# Patient Record
Sex: FEMALE | Race: WHITE | NOT HISPANIC OR LATINO | Employment: OTHER | URBAN - METROPOLITAN AREA
[De-identification: names, ages, dates, MRNs, and addresses within clinical notes are randomized per-mention and may not be internally consistent; named-entity substitution may affect disease eponyms.]

---

## 2017-09-11 ENCOUNTER — TRANSCRIBE ORDERS (OUTPATIENT)
Dept: ADMINISTRATIVE | Facility: HOSPITAL | Age: 82
End: 2017-09-11

## 2017-09-11 DIAGNOSIS — Z12.31 VISIT FOR SCREENING MAMMOGRAM: Primary | ICD-10-CM

## 2017-09-27 ENCOUNTER — HOSPITAL ENCOUNTER (OUTPATIENT)
Dept: RADIOLOGY | Facility: MEDICAL CENTER | Age: 82
Discharge: HOME | End: 2017-09-27
Payer: MEDICARE

## 2017-09-27 DIAGNOSIS — Z12.31 VISIT FOR SCREENING MAMMOGRAM: ICD-10-CM

## 2017-09-27 PROCEDURE — G0202 SCR MAMMO BI INCL CAD: HCPCS

## 2017-09-29 ENCOUNTER — TRANSCRIBE ORDERS (OUTPATIENT)
Dept: ADMINISTRATIVE | Facility: HOSPITAL | Age: 82
End: 2017-09-29

## 2017-09-29 DIAGNOSIS — R68.81 EARLY SATIETY: Primary | ICD-10-CM

## 2017-10-05 ENCOUNTER — HOSPITAL ENCOUNTER (OUTPATIENT)
Dept: RADIOLOGY | Facility: HOSPITAL | Age: 82
Discharge: HOME | End: 2017-10-05
Attending: INTERNAL MEDICINE
Payer: MEDICARE

## 2017-10-05 DIAGNOSIS — R68.81 EARLY SATIETY: ICD-10-CM

## 2017-10-05 PROCEDURE — 74240 X-RAY XM UPR GI TRC 1CNTRST: CPT

## 2018-02-27 ENCOUNTER — TRANSCRIBE ORDERS (OUTPATIENT)
Dept: ADMINISTRATIVE | Facility: HOSPITAL | Age: 83
End: 2018-02-27

## 2018-02-27 ENCOUNTER — LAB REQUISITION (OUTPATIENT)
Dept: LAB | Facility: HOSPITAL | Age: 83
End: 2018-02-27
Payer: MEDICARE

## 2018-02-27 DIAGNOSIS — N39.0 URINARY TRACT INFECTION WITHOUT HEMATURIA, SITE UNSPECIFIED: Primary | ICD-10-CM

## 2018-02-27 DIAGNOSIS — N39.0 URINARY TRACT INFECTION: ICD-10-CM

## 2018-02-27 PROCEDURE — 87086 URINE CULTURE/COLONY COUNT: CPT | Performed by: UROLOGY

## 2018-02-28 LAB — BACTERIA UR CULT: NORMAL

## 2018-03-02 ENCOUNTER — APPOINTMENT (OUTPATIENT)
Dept: LAB | Facility: MEDICAL CENTER | Age: 83
End: 2018-03-02
Payer: MEDICARE

## 2018-03-02 ENCOUNTER — HOSPITAL ENCOUNTER (OUTPATIENT)
Dept: RADIOLOGY | Facility: MEDICAL CENTER | Age: 83
Discharge: HOME | End: 2018-03-02
Payer: MEDICARE

## 2018-03-02 DIAGNOSIS — N39.0 URINARY TRACT INFECTION WITHOUT HEMATURIA, SITE UNSPECIFIED: ICD-10-CM

## 2018-03-02 LAB
BUN SERPL-MCNC: 14 MG/DL (ref 5–25)
CREAT SERPL-MCNC: 0.8 MG/DL (ref 0.6–1.3)
ERYTHROCYTE [DISTWIDTH] IN BLOOD BY AUTOMATED COUNT: 13.1 % (ref 11.6–15.1)
GFR SERPL CREATININE-BSD FRML MDRD: 68 ML/MIN/1.73SQ M
HCT VFR BLD AUTO: 42.8 % (ref 34.8–46.1)
HGB BLD-MCNC: 13.6 G/DL (ref 11.5–15.4)
MCH RBC QN AUTO: 30.6 PG (ref 26.8–34.3)
MCHC RBC AUTO-ENTMCNC: 31.8 G/DL (ref 31.4–37.4)
MCV RBC AUTO: 96 FL (ref 82–98)
PLATELET # BLD AUTO: 177 THOUSANDS/UL (ref 149–390)
PMV BLD AUTO: 11 FL (ref 8.9–12.7)
RBC # BLD AUTO: 4.45 MILLION/UL (ref 3.81–5.12)
WBC # BLD AUTO: 4.76 THOUSAND/UL (ref 4.31–10.16)

## 2018-03-02 PROCEDURE — 51798 US URINE CAPACITY MEASURE: CPT

## 2018-03-02 PROCEDURE — 84520 ASSAY OF UREA NITROGEN: CPT

## 2018-03-02 PROCEDURE — 82565 ASSAY OF CREATININE: CPT

## 2018-03-02 PROCEDURE — 36415 COLL VENOUS BLD VENIPUNCTURE: CPT

## 2018-03-02 PROCEDURE — 85027 COMPLETE CBC AUTOMATED: CPT

## 2018-10-23 ENCOUNTER — TRANSCRIBE ORDERS (OUTPATIENT)
Dept: ADMINISTRATIVE | Facility: HOSPITAL | Age: 83
End: 2018-10-23

## 2018-10-23 DIAGNOSIS — Z12.39 SCREENING BREAST EXAMINATION: Primary | ICD-10-CM

## 2019-01-17 ENCOUNTER — APPOINTMENT (OUTPATIENT)
Dept: RADIOLOGY | Facility: MEDICAL CENTER | Age: 84
End: 2019-01-17
Payer: MEDICARE

## 2019-01-17 ENCOUNTER — TRANSCRIBE ORDERS (OUTPATIENT)
Dept: ADMINISTRATIVE | Facility: HOSPITAL | Age: 84
End: 2019-01-17

## 2019-01-17 DIAGNOSIS — M05.9 RHEUMATOID ARTHRITIS WITH POSITIVE RHEUMATOID FACTOR, INVOLVING UNSPECIFIED SITE (HCC): Primary | ICD-10-CM

## 2019-01-17 DIAGNOSIS — M05.9 RHEUMATOID ARTHRITIS WITH POSITIVE RHEUMATOID FACTOR, INVOLVING UNSPECIFIED SITE (HCC): ICD-10-CM

## 2019-01-17 PROCEDURE — 73030 X-RAY EXAM OF SHOULDER: CPT

## 2019-11-19 ENCOUNTER — HOSPITAL ENCOUNTER (OUTPATIENT)
Dept: RADIOLOGY | Facility: MEDICAL CENTER | Age: 84
Discharge: HOME | End: 2019-11-19
Payer: MEDICARE

## 2019-11-19 VITALS — HEIGHT: 59 IN | WEIGHT: 122 LBS | BODY MASS INDEX: 24.6 KG/M2

## 2019-11-19 DIAGNOSIS — Z12.39 SCREENING BREAST EXAMINATION: ICD-10-CM

## 2019-11-19 PROCEDURE — 77067 SCR MAMMO BI INCL CAD: CPT

## 2019-12-03 ENCOUNTER — TRANSCRIBE ORDERS (OUTPATIENT)
Dept: ADMINISTRATIVE | Facility: HOSPITAL | Age: 84
End: 2019-12-03

## 2019-12-03 DIAGNOSIS — J98.4 SCARRING OF LUNG: Primary | ICD-10-CM

## 2019-12-11 ENCOUNTER — HOSPITAL ENCOUNTER (OUTPATIENT)
Dept: RADIOLOGY | Facility: MEDICAL CENTER | Age: 84
Discharge: HOME | End: 2019-12-11
Payer: MEDICARE

## 2019-12-11 DIAGNOSIS — J98.4 SCARRING OF LUNG: ICD-10-CM

## 2019-12-11 PROCEDURE — 71250 CT THORAX DX C-: CPT

## 2020-01-22 ENCOUNTER — OFFICE VISIT (OUTPATIENT)
Dept: OBGYN CLINIC | Facility: OTHER | Age: 85
End: 2020-01-22
Payer: MEDICARE

## 2020-01-22 ENCOUNTER — APPOINTMENT (OUTPATIENT)
Dept: RADIOLOGY | Facility: OTHER | Age: 85
End: 2020-01-22
Payer: MEDICARE

## 2020-01-22 VITALS
SYSTOLIC BLOOD PRESSURE: 192 MMHG | WEIGHT: 127 LBS | BODY MASS INDEX: 24.94 KG/M2 | HEART RATE: 78 BPM | DIASTOLIC BLOOD PRESSURE: 72 MMHG | HEIGHT: 60 IN

## 2020-01-22 DIAGNOSIS — M19.011 PRIMARY OSTEOARTHRITIS OF RIGHT SHOULDER: ICD-10-CM

## 2020-01-22 DIAGNOSIS — M25.511 ACUTE PAIN OF RIGHT SHOULDER: ICD-10-CM

## 2020-01-22 DIAGNOSIS — M54.12 RIGHT CERVICAL RADICULOPATHY: ICD-10-CM

## 2020-01-22 DIAGNOSIS — M25.511 RIGHT SHOULDER PAIN, UNSPECIFIED CHRONICITY: ICD-10-CM

## 2020-01-22 DIAGNOSIS — M75.81 RIGHT ROTATOR CUFF TENDONITIS: Primary | ICD-10-CM

## 2020-01-22 PROCEDURE — 73030 X-RAY EXAM OF SHOULDER: CPT

## 2020-01-22 PROCEDURE — 99203 OFFICE O/P NEW LOW 30 MIN: CPT | Performed by: ORTHOPAEDIC SURGERY

## 2020-01-22 RX ORDER — HYDROCHLOROTHIAZIDE 12.5 MG/1
TABLET ORAL
COMMUNITY
Start: 2020-01-21 | End: 2021-04-06 | Stop reason: HOSPADM

## 2020-01-22 RX ORDER — LOSARTAN POTASSIUM 50 MG/1
TABLET ORAL
COMMUNITY
Start: 2016-11-16 | End: 2021-04-06 | Stop reason: HOSPADM

## 2020-01-22 RX ORDER — ATORVASTATIN CALCIUM 20 MG/1
TABLET, FILM COATED ORAL
COMMUNITY

## 2020-01-22 NOTE — PROGRESS NOTES
Orthopaedic Surgery - Office Note  Ozzy Felix (80 y o  female)   : 1933   MRN: [id number]  Encounter Date: 2020    Chief Complaint   Patient presents with    Right Shoulder - Pain       Assessment / Plan   Diagnosis ICD-10-CM Associated Orders   1  Right rotator cuff tendonitis M75 81 Ambulatory referral to Physical Therapy   2  Acute pain of right shoulder M25 511 XR shoulder 2+ vw right   3  Right cervical radiculopathy M54 12 Ambulatory referral to Physical Therapy   4  Primary osteoarthritis of right shoulder M19 011        · Diagnostics reviewed and physical exam performed  Diagnosis, treatment options and associated risks were discussed with the patient including no treatment, nonsurgical treatment and potential for surgical intervention  The patient was given the opportunity to ask questions regarding each  · Appears that she has rotator cuff inflammation with underlying cervical radiculopathy  · Activity as tolerated  · Begin outpatient PT for rotator cuff strengthening and restoring shoulder motion     Return in about 6 weeks (around 3/4/2020) for re-check  History of Present Illness  Ozzy Felix is a 80 y o  LHD female who presents surgical consultation regarding pain at her right shoulder  Patient states that she fell In November aggravating her right shoulder symptoms however was doing well until about 2 weeks ago, when her pain returned  She went to a local urgent care facilities where x-rays were taken and followed her primary care physician  At her PCPs office a right shoulder cortisone injection was administered without any improvement of her right shoulder symptoms  She does find that her right upper extremity is weaker than previously  She has pain laterally at her right upper extremity which radiates just proximally to her elbow, occasionally to her fingers with numbness or tingling  She denies any significant neck pain    Patient has a history of RA and takes prednisone  Review of Systems  Pertinent items are noted in HPI  All other systems were reviewed and are negative  Physical Exam  BP (!) 192/72   Pulse 78   Ht 5' (1 524 m)   Wt 57 6 kg (127 lb)   BMI 24 80 kg/m²   Cons: Appears well  No apparent distress  Psych: Alert  Oriented x3  Mood and affect normal   Eyes: PERRLA, EOMI  Resp: Normal effort  No audible wheezing or stridor  CV: Palpable pulse  No discernable arrhythmia  No LE edema  Lymph:  No palpable cervical, axillary, or inguinal lymphadenopathy  Skin: Warm  No palpable masses  No visible lesions  Neuro: Normal muscle tone  Normal and symmetric DTR's  Right Shoulder Exam  Alignment / Posture:  Normal shoulder posture  Inspection:  No swelling  No edema  No erythema  No ecchymosis  No muscle atrophy  No deformity  Palpation:  Mild tenderness at subacromial space  No effusion  No warmth  No clicking, catching, or snapping  ROM:  Shoulder  vs L = 160  Shoulder ER 60 vs L = 60  Shoulder IR T10  Strength:  Rotator cuff 4+/5, mild discomfort but no giving way with resistance  5/5 biceps and triceps  Stability:  No objective shoulder instability  Tests: (+) Goodwin  (-) Neer  mildly + spurlings to the right  Neurovascular:  Sensation intact C5-T1 BUE  Studies Reviewed  The attending physician has personally reviewed the pertinent films in PACS and interpretation is as follows:  Right shoulder x-rays taken and reviewed in the office today show:  No acute fracture dislocation, moderate AC joint degeneration with mild glenohumeral joint degenerative changes, cystic changes greater tuberosity, type 2/3 acromion    Procedures  No procedures today  Medical, Surgical, Family, and Social History  The patient's medical history, family history, and social history, were reviewed and updated as appropriate  History reviewed  No pertinent past medical history      Past Surgical History:   Procedure Laterality Date    BREAST BIOPSY Left 02/18/2010    calcs    HYSTERECTOMY      age 40    OOPHORECTOMY Right     age 40       Family History   Problem Relation Age of Onset    No Known Problems Mother     No Known Problems Father     No Known Problems Sister     No Known Problems Daughter     No Known Problems Sister     No Known Problems Son     No Known Problems Son     No Known Problems Son        Social History     Occupational History    Not on file   Tobacco Use    Smoking status: Never Smoker    Smokeless tobacco: Never Used   Substance and Sexual Activity    Alcohol use: Not on file    Drug use: Not on file    Sexual activity: Not on file       Allergies   Allergen Reactions    Abatacept     Acyclovir GI Intolerance    Celecoxib     Denosumab Other (See Comments)     Severe jaw pain    Hydroxychloroquine Hives    Nabumetone Hives    Pantoprazole Hives    Sunscreens          Current Outpatient Medications:     atorvastatin (LIPITOR) 20 mg tablet, , Disp: , Rfl:     Calcium-Vitamin D-Vitamin K 500-1000-40 MG-UNT-MCG CHEW, , Disp: , Rfl:     hydrochlorothiazide (HYDRODIURIL) 12 5 mg tablet, , Disp: , Rfl:     losartan (COZAAR) 50 mg tablet, , Disp: , Rfl:     predniSONE 1 mg tablet, Take 2 5 mg by mouth daily, Disp: , Rfl:       Bessie Barrera    Scribe Attestation    I,:   Bessie Barrera am acting as a scribe while in the presence of the attending physician :        I,:   Ela Pham MD personally performed the services described in this documentation    as scribed in my presence :

## 2020-01-23 ENCOUNTER — EVALUATION (OUTPATIENT)
Dept: PHYSICAL THERAPY | Facility: MEDICAL CENTER | Age: 85
End: 2020-01-23
Payer: MEDICARE

## 2020-01-23 DIAGNOSIS — M54.12 RIGHT CERVICAL RADICULOPATHY: ICD-10-CM

## 2020-01-23 DIAGNOSIS — M75.81 RIGHT ROTATOR CUFF TENDONITIS: ICD-10-CM

## 2020-01-23 PROCEDURE — 97161 PT EVAL LOW COMPLEX 20 MIN: CPT | Performed by: PHYSICAL THERAPIST

## 2020-01-23 PROCEDURE — 97110 THERAPEUTIC EXERCISES: CPT | Performed by: PHYSICAL THERAPIST

## 2020-01-23 NOTE — PROGRESS NOTES
PT Evaluation     Today's date: 2020  Patient name: Ryan Saucedo  : 1933  MRN: [id number]  Referring provider: Bib Saavedra MD  Dx:   Encounter Diagnosis     ICD-10-CM    1  Right rotator cuff tendonitis M75 81 Ambulatory referral to Physical Therapy   2  Right cervical radiculopathy M54 12 Ambulatory referral to Physical Therapy       Start Time: 1405  Stop Time: 1440  Total time in clinic (min): 35 minutes    Assessment  Assessment details: Pt is a 80 y o female who presents with increased R shoulder pain, decreased R shoulder ROM, and decreased activity tolerance  These impairments limit the patient from participating in ADLs at [facility], increased difficulty performing house hold tasks, and decreased ability to participate in the community  Pt was educated on the RTC, its role in shoulder mechanics and the pathophysiology of her condition  I believe this patient is a good candidate for and will benefit from skilled physical therapy for UE ROM exercises, UE strengthening exercises, manual therapy to reduce symptoms and mechanics training to improve UE functional ability and assist the patient to return to PLOF      Positive Prognostic Indicators: good attitude towards PT    Negative Prognostic Indicators: chronic pathology  Impairments: abnormal or restricted ROM, activity intolerance, impaired physical strength, lacks appropriate home exercise program, pain with function and poor posture     Symptom irritability: lowUnderstanding of Dx/Px/POC: good   Prognosis: good    Goals  STGs: 4 weeks  1) pt will have a SPR decrease of 2 units at rest  2) pt will have improved R shoulder flexion AROM by 10*  3) pt will have improved R shoulder flexion strength by 1/2 muscle grade    LTGs: 8 weeks  1) pt will be independent with HEP by D/C  2) pt will be independent with symptom management by D/C  3) pt will have no more than 1/10 pain with above head reaching in order to return to cleaning house at PLOF    Plan  Patient would benefit from: skilled physical therapy  Planned modality interventions: cryotherapy and thermotherapy: hydrocollator packs  Planned therapy interventions: joint mobilization, manual therapy, neuromuscular re-education, patient education, postural training, strengthening, stretching, therapeutic activities, therapeutic exercise, home exercise program and functional ROM exercises  Frequency: 2x week  Duration in visits: 12  Duration in weeks: 6  Plan of Care beginning date: 1/23/2020  Plan of Care expiration date: 3/5/2020  Treatment plan discussed with: patient        Subjective Evaluation    History of Present Illness  Mechanism of injury:   Subjective Comments: fell in beginning of November  Fell on R UE  Went to ER  Had Xray  Was on prednisone  Now she was taking double  It was helping with pain but only making it manageable  Now pain has increased again  Pt had cortisone shot which did not help  Then she went to ortho MD   Pt originates in the shoulder and goes down the arm  Denies neck pain and hitting head during fall  Denies N&T in arm now  She had N&T when she first fell, but this has went away  Reaching up high and behind back are the most painful  Pt has increased difficulty picking up heavy objects  Pt is L handed  Pain   Rest: 4/10   Best: 2/10   Worst: 10/10    Relieving Factors: medications    Exacerbating Factors: bad weather makes the pain worse, lifitng objects    Sleeping: sleeping ok but no problems because of shoulder    ADLs: cleaning pushing vacuum, reaching too high to dust    Work/Hobbies: coshering     Previous Treatment: cortisone injection, prednisone,     Goals: wants to move arm better, decrease pain, get it close to prior to falling  Objective     Static Posture     Head  Forward  Shoulders  Rounded  Scapulae  Left protracted and right protracted      Palpation     Right   Tenderness of the anterior deltoid, infraspinatus, middle deltoid, posterior deltoid, supraspinatus and upper trapezius  Active Range of Motion   Left Shoulder   Flexion: 131 degrees   Abduction: 168 degrees   External rotation BTH: T5   Internal rotation BTB: T6     Right Shoulder   Flexion: 113 degrees   Abduction: 115 degrees   External rotation BTH: T2   Internal rotation BTB: L3     Passive Range of Motion   Left Shoulder   Normal passive range of motion    Right Shoulder   Flexion: 150 degrees with pain  Abduction: 170 degrees with pain  External rotation 90°: 90 degrees with pain  Internal rotation 90°: 50 degrees with pain    Strength/Myotome Testing     Left Shoulder     Planes of Motion   Flexion: 4+   Abduction: 4+   External rotation at 0°: 4   Internal rotation at 0°: 4+     Right Shoulder     Planes of Motion   Flexion: 4   Abduction: 3+   External rotation at 0°: 3+   Internal rotation at 0°: 4     Tests     Right Shoulder   Positive Christopher/Leonard and Neer's  Negative drop arm         Flowsheet Rows      Most Recent Value   PT/OT G-Codes   Current Score  55   Projected Score  66   Assessment Type  Evaluation   G code set  Other PT/OT Primary   Other PT Primary Current Status ()  CK   Other PT Primary Goal Status ()  CJ             Precautions: arrhythmias, HTN      Manual              R shoulder PROM                                                                     Exercise Diary  1/23            Supine cane flexion x10            Isometrics (flex/abd/IR/ER) 5"x10 ea             pullys             Seated scap retract             pendulums                                                                                                                                                                                                                    Modalities

## 2020-01-29 ENCOUNTER — OFFICE VISIT (OUTPATIENT)
Dept: PHYSICAL THERAPY | Facility: MEDICAL CENTER | Age: 85
End: 2020-01-29
Payer: MEDICARE

## 2020-01-29 DIAGNOSIS — M54.12 RIGHT CERVICAL RADICULOPATHY: ICD-10-CM

## 2020-01-29 DIAGNOSIS — M75.81 RIGHT ROTATOR CUFF TENDONITIS: Primary | ICD-10-CM

## 2020-01-29 PROCEDURE — 97110 THERAPEUTIC EXERCISES: CPT | Performed by: PHYSICAL THERAPIST

## 2020-01-29 PROCEDURE — 97112 NEUROMUSCULAR REEDUCATION: CPT | Performed by: PHYSICAL THERAPIST

## 2020-01-29 NOTE — PROGRESS NOTES
Daily Note     Today's date: 2020  Patient name: Dayanna Lanier  : 1933  MRN: [id number]  Referring provider: Arianna Alvarez MD  Dx:   Encounter Diagnosis     ICD-10-CM    1  Right rotator cuff tendonitis M75 81    2  Right cervical radiculopathy M54 12        Start Time: 930  Stop Time: 1005  Total time in clinic (min): 35 minutes    Subjective: pt states that her shoulder is feeling a little better  Objective: See treatment diary below      Assessment: Tolerated treatment well  Pt had improved tolerance to exercises this session  Additional exercises caused mild increase of symptoms that the patient stated were tolerable  HEP progressed  Continue to monitor and progress as tolerated  Patient would benefit from continued PT      Plan: Continue per plan of care  Precautions: arrhythmias, HTN      Manual              R shoulder PROM 8'                                                                    Exercise Diary             Supine cane flexion x10 x20           Isometrics (flex/abd/IR/ER) 5"x10 ea   5" x10 ea            pullys  5'           Seated scap retract  5" x10           pendulums  nv           TB ER/IR  nv           TB rows/ext  rtb 2x10                                                                                                                                                                                        Modalities

## 2020-01-31 ENCOUNTER — OFFICE VISIT (OUTPATIENT)
Dept: PHYSICAL THERAPY | Facility: MEDICAL CENTER | Age: 85
End: 2020-01-31
Payer: MEDICARE

## 2020-01-31 DIAGNOSIS — M54.12 RIGHT CERVICAL RADICULOPATHY: ICD-10-CM

## 2020-01-31 DIAGNOSIS — M75.81 RIGHT ROTATOR CUFF TENDONITIS: Primary | ICD-10-CM

## 2020-01-31 PROCEDURE — 97112 NEUROMUSCULAR REEDUCATION: CPT | Performed by: PHYSICAL THERAPIST

## 2020-01-31 PROCEDURE — 97110 THERAPEUTIC EXERCISES: CPT | Performed by: PHYSICAL THERAPIST

## 2020-01-31 NOTE — PROGRESS NOTES
Daily Note     Today's date: 2020  Patient name: Elda Nesbitt  : 1933  MRN: [id number]  Referring provider: Nelle Bumpers, MD  Dx:   Encounter Diagnosis     ICD-10-CM    1  Right rotator cuff tendonitis M75 81    2  Right cervical radiculopathy M54 12        Start Time: 1148  Stop Time: 1224  Total time in clinic (min): 36 minutes    Subjective: pt states that she is a little sore upon arrival       Objective: See treatment diary below      Assessment: Tolerated treatment well  Pt had TTP in the anterior shoulder with subjective remarks of improved symptoms following STM  Pt tolerated additional exercises well with minimal complaints of symptoms  HEP progressed  Continue to monitor and progress as tolerated  Patient would benefit from continued PT      Plan: Continue per plan of care  Precautions: arrhythmias, HTN      Manual             R shoulder PROM 8' 8' + STM anterior shoulder                                                                   Exercise Diary            Supine cane flexion x10 x20 x20          Isometrics (flex/abd/IR/ER) 5"x10 ea  5" x10 ea  5"x10          pullys  5' 5'          Seated scap retract  5" x10 5"x10          Pendulums (f/b and s/s)  nv x20 ea  TB ER/IR  nv ER ytb x20 ea  IR rtb x20 ea  TB rows/ext  rtb 2x10 rtb 2x10 ea            Supine ABCs   x1          No monies    ytb 2x10                                                                                                                                                             Modalities

## 2020-02-04 ENCOUNTER — OFFICE VISIT (OUTPATIENT)
Dept: PHYSICAL THERAPY | Facility: MEDICAL CENTER | Age: 85
End: 2020-02-04
Payer: MEDICARE

## 2020-02-04 DIAGNOSIS — M75.81 RIGHT ROTATOR CUFF TENDONITIS: Primary | ICD-10-CM

## 2020-02-04 DIAGNOSIS — M54.12 RIGHT CERVICAL RADICULOPATHY: ICD-10-CM

## 2020-02-04 PROCEDURE — 97112 NEUROMUSCULAR REEDUCATION: CPT

## 2020-02-04 PROCEDURE — 97110 THERAPEUTIC EXERCISES: CPT

## 2020-02-04 NOTE — PROGRESS NOTES
Daily Note     Today's date: 2020  Patient name: Ranjan Noel  : 1933  MRN: [id number]  Referring provider: Mohamud Mcclure MD  Dx:   Encounter Diagnosis     ICD-10-CM    1  Right rotator cuff tendonitis M75 81    2  Right cervical radiculopathy M54 12                   Subjective: patient noted pain in R shoulder patient attributed to weather today  Objective: See treatment diary below      Assessment: Tolerated treatment fair  Patient needed VC throughout treatment to correct form with exercises  Patient responded well to STM to help decrease restrictions in fascia  Patient would benefit from continued PT      Plan: Continue per plan of care  Precautions: arrhythmias, HTN      Manual            R shoulder PROM 8' 8' + STM anterior shoulder 8' + STM anterior shoulder                                                                  Exercise Diary           Supine cane flexion x10 x20 x20 x20         Isometrics (flex/abd/IR/ER) 5"x10 ea  5" x10 ea  5"x10 5"x10         pullys  5' 5' 5'         Seated scap retract  5" x10 5"x10 5"x10         Pendulums (f/b and s/s)  nv x20 ea  x20ea         TB ER/IR  nv ER ytb x20 ea  IR rtb x20 ea  ER ytb x20 ea  IR rtb x20 ea  TB rows/ext  rtb 2x10 rtb 2x10 ea   RTB 2 x10 ea         Supine ABCs   x1 x1         No monies    ytb 2x10 2x10 YTB                                                                                                                                                            Modalities

## 2020-02-06 ENCOUNTER — OFFICE VISIT (OUTPATIENT)
Dept: PHYSICAL THERAPY | Facility: MEDICAL CENTER | Age: 85
End: 2020-02-06
Payer: MEDICARE

## 2020-02-06 DIAGNOSIS — M75.81 RIGHT ROTATOR CUFF TENDONITIS: Primary | ICD-10-CM

## 2020-02-06 DIAGNOSIS — M54.12 RIGHT CERVICAL RADICULOPATHY: ICD-10-CM

## 2020-02-06 PROCEDURE — 97112 NEUROMUSCULAR REEDUCATION: CPT

## 2020-02-06 PROCEDURE — 97140 MANUAL THERAPY 1/> REGIONS: CPT

## 2020-02-06 PROCEDURE — 97110 THERAPEUTIC EXERCISES: CPT

## 2020-02-06 NOTE — PROGRESS NOTES
Daily Note     Today's date: 2020  Patient name: Ruth Rosenthal  : 1933  MRN: [id number]  Referring provider: Bob Gandara MD  Dx:   Encounter Diagnosis     ICD-10-CM    1  Right rotator cuff tendonitis M75 81    2  Right cervical radiculopathy M54 12          Subjective: Patient noted some soreness in shoulder attributed to weather  Objective: See treatment diary below      Assessment: Tolerated treatment well  Added standing scaption into treatment with no patient complaints VC to correct form  Patient would benefit from continued PT      Plan: Continue per plan of care  Precautions: arrhythmias, HTN      Manual           R shoulder PROM 8' 8' + STM anterior shoulder 8' + STM anterior shoulder 8' + STM anterior shoulder                                                                 Exercise Diary          Supine cane flexion x10 x20 x20 x20 x5  x15 1# weight        Isometrics (flex/abd/IR/ER) 5"x10 ea  5" x10 ea  5"x10 5"x10 5"x10        pullys  5' 5' 5' 5'        Seated scap retract  5" x10 5"x10 5"x10 5"x15        Pendulums (f/b and s/s)  nv x20 ea  x20ea x20ea        TB ER/IR  nv ER ytb x20 ea  IR rtb x20 ea  ER ytb x20 ea  IR rtb x20 ea  ER RTB x10 ea  IR rtb x20 ea  TB rows/ext  rtb 2x10 rtb 2x10 ea   RTB 2 x10 ea RTB 2 x10 ea        Supine ABCs   x1 x1 x1        No monies    ytb 2x10 2x10 YTB 2x10 YTB        Standing scaption     15x                                                                                                                                               Modalities

## 2020-02-11 ENCOUNTER — OFFICE VISIT (OUTPATIENT)
Dept: PHYSICAL THERAPY | Facility: MEDICAL CENTER | Age: 85
End: 2020-02-11
Payer: MEDICARE

## 2020-02-11 DIAGNOSIS — M54.12 RIGHT CERVICAL RADICULOPATHY: ICD-10-CM

## 2020-02-11 DIAGNOSIS — M75.81 RIGHT ROTATOR CUFF TENDONITIS: Primary | ICD-10-CM

## 2020-02-11 PROCEDURE — 97110 THERAPEUTIC EXERCISES: CPT | Performed by: PHYSICAL THERAPIST

## 2020-02-11 PROCEDURE — 97112 NEUROMUSCULAR REEDUCATION: CPT | Performed by: PHYSICAL THERAPIST

## 2020-02-11 NOTE — PROGRESS NOTES
Daily Note     Today's date: 2020  Patient name: Anshu Beebe  : 1933  MRN: [id number]  Referring provider: Erin Mora MD  Dx:   Encounter Diagnosis     ICD-10-CM    1  Right rotator cuff tendonitis M75 81    2  Right cervical radiculopathy M54 12        Start Time: 0900  Stop Time: 0932  Total time in clinic (min): 32 minutes    Subjective: pt states that her shoulder is feeling much better  Objective: See treatment diary below      Assessment: Tolerated treatment well  Pt continues to demonstrate improvements from  [address]  Pt has no pain with strengthening exercises  Continue to monitor and progress as tolerated  Patient would benefit from continued PT      Plan: Continue per plan of care  Precautions: arrhythmias, HTN      Manual          R shoulder PROM 8' 8' + STM anterior shoulder 8' + STM anterior shoulder 8' + STM anterior shoulder 8' + STM anterior shoulder                                                                Exercise Diary         Supine cane flexion x10 x20 x20 x20 x5  x15 1# weight 2x10 #1        Isometrics (flex/abd/IR/ER) 5"x10 ea  5" x10 ea  5"x10 5"x10 5"x10 5"x10       pullys  5' 5' 5' 5' 5'       Seated scap retract  5" x10 5"x10 5"x10 5"x15 5"x20       Pendulums (f/b and s/s)  nv x20 ea  x20ea x20ea x20 ea  TB ER/IR  nv ER ytb x20 ea  IR rtb x20 ea  ER ytb x20 ea  IR rtb x20 ea  ER RTB x10 ea  IR rtb x20 ea  ER RTB x10 ea  IR rtb x20 ea  TB rows/ext  rtb 2x10 rtb 2x10 ea   RTB 2 x10 ea RTB 2 x10 ea RTB 2 x10 ea       Supine ABCs   x1 x1 x1 x1       No monies    ytb 2x10 2x10 YTB 2x10 YTB 2x10 ytb       Standing scaption     15x  x20 + cones                                                                                                                                             Modalities

## 2020-02-13 ENCOUNTER — OFFICE VISIT (OUTPATIENT)
Dept: PHYSICAL THERAPY | Facility: MEDICAL CENTER | Age: 85
End: 2020-02-13
Payer: MEDICARE

## 2020-02-13 DIAGNOSIS — M54.12 RIGHT CERVICAL RADICULOPATHY: ICD-10-CM

## 2020-02-13 DIAGNOSIS — M75.81 RIGHT ROTATOR CUFF TENDONITIS: Primary | ICD-10-CM

## 2020-02-13 PROCEDURE — 97110 THERAPEUTIC EXERCISES: CPT | Performed by: PHYSICAL THERAPIST

## 2020-02-13 PROCEDURE — 97112 NEUROMUSCULAR REEDUCATION: CPT | Performed by: PHYSICAL THERAPIST

## 2020-02-13 NOTE — PROGRESS NOTES
Daily Note     Today's date: 2020  Patient name: Rosalie Gayle  : 1933  MRN: [id number]  Referring provider: Evelio Coulter MD  Dx:   Encounter Diagnosis     ICD-10-CM    1  Right rotator cuff tendonitis M75 81    2  Right cervical radiculopathy M54 12        Start Time: 0458  Stop Time: 0930  Total time in clinic (min): 38 minutes    Subjective: pt states that she continues to have a little pain in the shoulder, but states that it is the weather  Objective: See treatment diary below      Assessment: Tolerated treatment well  Pt completed all exercises with no complaints of shoulder symptoms  Pt tolerated increase in intensity well with minor complaints of increased soreness following treatment session  Continue to monitor and progress as tolerated  Patient would benefit from continued PT      Plan: Continue per plan of care  Precautions: arrhythmias, HTN    Pt 1:1 from [phone]  Manual   2 2       R shoulder PROM 8' 8' + STM anterior shoulder 8' + STM anterior shoulder 8' + STM anterior shoulder 8' + STM anterior shoulder 8'                                                               Exercise Diary   2      Supine cane flexion x10 x20 x20 x20 x5  x15 1# weight 2x10 #1  2x10 #1 5      Isometrics (flex/abd/IR/ER) 5"x10 ea  5" x10 ea  5"x10 5"x10 5"x10 5"x10 5"x10      pullys  5' 5' 5' 5' 5' 5'      Seated scap retract  5" x10 5"x10 5"x10 5"x15 5"x20 5"x20      Pendulums (f/b and s/s)  nv x20 ea  x20ea x20ea x20 ea  HEP      TB ER/IR  nv ER ytb x20 ea  IR rtb x20 ea  ER ytb x20 ea  IR rtb x20 ea  ER RTB x10 ea  IR rtb x20 ea  ER RTB x10 ea  IR rtb x20 ea  gtb 2x10 ea  TB rows/ext  rtb 2x10 rtb 2x10 ea  RTB 2 x10 ea RTB 2 x10 ea RTB 2 x10 ea gtb 2x10 ea        Supine ABCs   x1 x1 x1 x1 x1  5#      No monies    ytb 2x10 2x10 YTB 2x10 YTB 2x10 ytb rtb 2x10      Standing scaption     15x  x20 + cones #1 x10      TB chest press rtb 2x10      [employer] on shelf       7 cones x2                                                                                                                  Modalities

## 2020-02-18 ENCOUNTER — OFFICE VISIT (OUTPATIENT)
Dept: PHYSICAL THERAPY | Facility: MEDICAL CENTER | Age: 85
End: 2020-02-18
Payer: MEDICARE

## 2020-02-18 DIAGNOSIS — M75.81 RIGHT ROTATOR CUFF TENDONITIS: Primary | ICD-10-CM

## 2020-02-18 DIAGNOSIS — M54.12 RIGHT CERVICAL RADICULOPATHY: ICD-10-CM

## 2020-02-18 PROCEDURE — 97140 MANUAL THERAPY 1/> REGIONS: CPT | Performed by: PHYSICAL THERAPIST

## 2020-02-18 PROCEDURE — 97110 THERAPEUTIC EXERCISES: CPT | Performed by: PHYSICAL THERAPIST

## 2020-02-18 NOTE — PROGRESS NOTES
Daily Note     Today's date: 2020  Patient name: Ozzy Felix  : 1933  MRN: [id number]  Referring provider: Sherry Hammond MD  Dx:   Encounter Diagnosis     ICD-10-CM    1  Right rotator cuff tendonitis M75 81    2  Right cervical radiculopathy M54 12        Start Time: 0900  Stop Time: 0930  Total time in clinic (min): 30 minutes    Subjective: pt reports to therapy with no complaints of pain      Objective: See treatment diary below      Assessment: Tolerated treatment well  Pt was able to complete all exercise with minimal instruction  No increase in symptoms  Re-evaluation will be compelted NV  Continue to monitor and progress as tolerated  Patient would benefit from continued PT      Plan: Continue per plan of care  Precautions: arrhythmias, HTN    Pt 1:1 from [phone]  Manual   2/ 2      R shoulder PROM 8' 8' + STM anterior shoulder 8' + STM anterior shoulder 8' + STM anterior shoulder 8' + STM anterior shoulder 8' 8'                                                              Exercise Diary   2 2/     Supine cane flexion x10 x20 x20 x20 x5  x15 1# weight 2x10 #1  2x10 #1 5 2x10 #1 5     Isometrics (flex/abd/IR/ER) 5"x10 ea  5" x10 ea  5"x10 5"x10 5"x10 5"x10 5"x10 5"x10     pullys  5' 5' 5' 5' 5' 5' 5'     Seated scap retract  5" x10 5"x10 5"x10 5"x15 5"x20 5"x20 5"x20     Pendulums (f/b and s/s)  nv x20 ea  x20ea x20ea x20 ea  HEP      TB ER/IR  nv ER ytb x20 ea  IR rtb x20 ea  ER ytb x20 ea  IR rtb x20 ea  ER RTB x10 ea  IR rtb x20 ea  ER RTB x10 ea  IR rtb x20 ea  gtb 2x10 ea  gtb 2x10 ea  TB rows/ext  rtb 2x10 rtb 2x10 ea  RTB 2 x10 ea RTB 2 x10 ea RTB 2 x10 ea gtb 2x10 ea  gtb 2x10  Ea       Supine ABCs   x1 x1 x1 x1 x1  5# x1 # 5     No monies    ytb 2x10 2x10 YTB 2x10 YTB 2x10 ytb rtb 2x10 rtb 2x10     Standing scaption     15x  x20 + cones #1 x10 #1 2x10     TB chest press       rtb 2x10 rtb 2x10     [employer] on shelf       7 cones x2 np                                                                                                                 Modalities

## 2020-02-20 ENCOUNTER — OFFICE VISIT (OUTPATIENT)
Dept: PHYSICAL THERAPY | Facility: MEDICAL CENTER | Age: 85
End: 2020-02-20
Payer: MEDICARE

## 2020-02-20 DIAGNOSIS — M75.81 RIGHT ROTATOR CUFF TENDONITIS: Primary | ICD-10-CM

## 2020-02-20 DIAGNOSIS — M54.12 RIGHT CERVICAL RADICULOPATHY: ICD-10-CM

## 2020-02-20 PROCEDURE — 97110 THERAPEUTIC EXERCISES: CPT | Performed by: PHYSICAL THERAPIST

## 2020-02-20 PROCEDURE — 97112 NEUROMUSCULAR REEDUCATION: CPT | Performed by: PHYSICAL THERAPIST

## 2020-02-20 NOTE — PROGRESS NOTES
PT Re-Evaluation  and PT Discharge    Today's date: 2020  Patient name: Ryan Saucedo  : 1933  MRN: [id number]  Referring provider: Bib Saavedra MD  Dx:   Encounter Diagnosis     ICD-10-CM    1  Right rotator cuff tendonitis M75 81    2  Right cervical radiculopathy M54 12        Start Time: 09  Stop Time: 927  Total time in clinic (min): 27 minutes    Assessment  Assessment details: Pt is a 80 y o female who has completed 9 PT sessions  She has made improvements in R shoulder ROM, R shoulder strength, decreased pain levels, and improved UE functional ability  Pt states that she has no complaints with her R UE and she feels comfortable and confident with independent management of symptoms and HEP completion  Secondary to gains made, lack of symptoms and independence with HEP, this patient will be DC from PT  Symptom irritability: lowUnderstanding of Dx/Px/POC: good   Prognosis: good    Goals  STGs: 4 weeks  1) pt will have a SPR decrease of 2 units at rest- met  2) pt will have improved R shoulder flexion AROM by 10*- met  3) pt will have improved R shoulder flexion strength by 1/2 muscle grade- met    LTGs: 8 weeks  1) pt will be independent with HEP by D/C- met  2) pt will be independent with symptom management by D/C- met  3) pt will have no more than 1/10 pain with above head reaching in order to return to [facility] details: Pt will be DC from PT    Patient would benefit from: skilled physical therapy  Planned modality interventions: cryotherapy and thermotherapy: hydrocollator packs  Planned therapy interventions: joint mobilization, manual therapy, neuromuscular re-education, patient education, postural training, strengthening, stretching, therapeutic activities, therapeutic exercise, home exercise program and functional ROM exercises  Frequency: 2x week  Duration in visits: 12  Duration in weeks: 6  Plan of Care beginning date: 2020  Plan of Care expiration date: 3/5/2020  Treatment plan discussed with: patient        Subjective Evaluation    History of Present Illness  Mechanism of injury:   Subjective Comments: Pt states that she states that she can no wlift her arm over head  She can also lift more weight  Pt states that she has an easier time grocery shopping now  Pt dneies having any complications right now  Pain:  0/10          Objective     Static Posture     Head  Forward  Shoulders  Rounded  Scapulae  Left protracted and right protracted      Active Range of Motion   Left Shoulder   Flexion: 131 degrees   Abduction: 168 degrees   External rotation BTH: T5   Internal rotation BTB: T6     Right Shoulder   Flexion: 150 degrees   Abduction: 160 degrees   External rotation BTH: T3   Internal rotation BTB: T12     Passive Range of Motion   Left Shoulder   Normal passive range of motion    Right Shoulder   Flexion: 170 degrees   Abduction: 170 degrees   External rotation 90°: 90 degrees   Internal rotation 90°: 70 degrees     Strength/Myotome Testing     Left Shoulder     Planes of Motion   Flexion: 4+   Abduction: 4+   External rotation at 0°: 4+   Internal rotation at 0°: 4+     Right Shoulder     Planes of Motion   Flexion: 4+   Abduction: 4+   External rotation at 0°: 4+   Internal rotation at 0°: 4+       Flowsheet Rows      Most Recent Value   PT/OT G-Codes   Current Score  62   Projected Score  66   Assessment Type  Discharge   G code set  Other PT/OT Primary   Other PT Primary Goal Status ()  CJ   Other PT Primary Discharge Status ()  CJ             Precautions: arrhythmias, HTN      Manual  1/29 1/31 2/4 2/6 2/11 2/13 2/18      R shoulder PROM 8' 8' + STM anterior shoulder 8' + STM anterior shoulder 8' + STM anterior shoulder 8' + STM anterior shoulder 8' 8'                                                              Exercise Diary  1/23 1/29 1/31 2/4 2/6 2/11 2/13 2/18 2/20    Supine cane flexion x10 x20 x20 x20 x5  x15 1# weight 2x10 #1  2x10 #1 5 2x10 #1 5 2x10 1 5#    Isometrics (flex/abd/IR/ER) 5"x10 ea  5" x10 ea  5"x10 5"x10 5"x10 5"x10 5"x10 5"x10     pullys  5' 5' 5' 5' 5' 5' 5' 5'    Seated scap retract  5" x10 5"x10 5"x10 5"x15 5"x20 5"x20 5"x20     Pendulums (f/b and s/s)  nv x20 ea  x20ea x20ea x20 ea  HEP      TB ER/IR  nv ER ytb x20 ea  IR rtb x20 ea  ER ytb x20 ea  IR rtb x20 ea  ER RTB x10 ea  IR rtb x20 ea  ER RTB x10 ea  IR rtb x20 ea  gtb 2x10 ea  gtb 2x10 ea  gtb 2x10 ea  TB rows/ext  rtb 2x10 rtb 2x10 ea  RTB 2 x10 ea RTB 2 x10 ea RTB 2 x10 ea gtb 2x10 ea  gtb 2x10  Ea  gtb 2x10  Ea      Supine ABCs   x1 x1 x1 x1 x1  5# x1 # 5 x1 #1    No monies    ytb 2x10 2x10 YTB 2x10 YTB 2x10 ytb rtb 2x10 rtb 2x10 rtb 2x10    Standing scaption     15x  x20 + cones #1 x10 #1 2x10 #1 2x10    TB chest press       rtb 2x10 rtb 2x10 gtb 2x10    Cone on shelf       7 cones x2 np                                                                                                                 Modalities

## 2020-02-25 ENCOUNTER — APPOINTMENT (OUTPATIENT)
Dept: PHYSICAL THERAPY | Facility: MEDICAL CENTER | Age: 85
End: 2020-02-25
Payer: MEDICARE

## 2020-02-27 ENCOUNTER — APPOINTMENT (OUTPATIENT)
Dept: PHYSICAL THERAPY | Facility: MEDICAL CENTER | Age: 85
End: 2020-02-27
Payer: MEDICARE

## 2020-04-06 ENCOUNTER — TELEPHONE (OUTPATIENT)
Dept: OBGYN CLINIC | Facility: MEDICAL CENTER | Age: 85
End: 2020-04-06

## 2020-06-03 ENCOUNTER — OFFICE VISIT (OUTPATIENT)
Dept: OBGYN CLINIC | Facility: OTHER | Age: 85
End: 2020-06-03
Payer: MEDICARE

## 2020-06-03 VITALS
SYSTOLIC BLOOD PRESSURE: 164 MMHG | HEIGHT: 60 IN | WEIGHT: 127 LBS | DIASTOLIC BLOOD PRESSURE: 74 MMHG | BODY MASS INDEX: 24.94 KG/M2 | HEART RATE: 73 BPM

## 2020-06-03 DIAGNOSIS — M75.81 ROTATOR CUFF TENDINITIS, RIGHT: Primary | ICD-10-CM

## 2020-06-03 PROCEDURE — 99213 OFFICE O/P EST LOW 20 MIN: CPT | Performed by: ORTHOPAEDIC SURGERY

## 2021-01-05 ENCOUNTER — TRANSCRIBE ORDERS (OUTPATIENT)
Dept: ADMINISTRATIVE | Facility: HOSPITAL | Age: 86
End: 2021-01-05

## 2021-01-05 DIAGNOSIS — Z12.31 ENCOUNTER FOR SCREENING MAMMOGRAM FOR MALIGNANT NEOPLASM OF BREAST: Primary | ICD-10-CM

## 2021-01-06 ENCOUNTER — HOSPITAL ENCOUNTER (OUTPATIENT)
Dept: RADIOLOGY | Facility: MEDICAL CENTER | Age: 86
Discharge: HOME | End: 2021-01-06
Payer: MEDICARE

## 2021-01-06 VITALS — WEIGHT: 127 LBS | BODY MASS INDEX: 24.94 KG/M2 | HEIGHT: 60 IN

## 2021-01-06 DIAGNOSIS — Z12.31 ENCOUNTER FOR SCREENING MAMMOGRAM FOR MALIGNANT NEOPLASM OF BREAST: ICD-10-CM

## 2021-01-06 PROCEDURE — 77067 SCR MAMMO BI INCL CAD: CPT

## 2021-01-06 PROCEDURE — 77063 BREAST TOMOSYNTHESIS BI: CPT

## 2021-01-13 ENCOUNTER — TRANSCRIBE ORDERS (OUTPATIENT)
Dept: ADMINISTRATIVE | Facility: HOSPITAL | Age: 86
End: 2021-01-13

## 2021-01-13 DIAGNOSIS — R42 DIZZINESS AND GIDDINESS: ICD-10-CM

## 2021-01-13 DIAGNOSIS — I67.9 CEREBROVASCULAR DISEASE, UNSPECIFIED: Primary | ICD-10-CM

## 2021-01-13 DIAGNOSIS — G45.9 TRANSIENT CEREBRAL ISCHEMIC ATTACK, UNSPECIFIED: ICD-10-CM

## 2021-01-29 ENCOUNTER — HOSPITAL ENCOUNTER (OUTPATIENT)
Dept: RADIOLOGY | Facility: HOSPITAL | Age: 86
Discharge: HOME | End: 2021-01-29
Payer: MEDICARE

## 2021-01-29 DIAGNOSIS — I67.9 CEREBROVASCULAR DISEASE, UNSPECIFIED: ICD-10-CM

## 2021-01-29 PROCEDURE — 70551 MRI BRAIN STEM W/O DYE: CPT

## 2021-01-29 PROCEDURE — G1004 CDSM NDSC: HCPCS

## 2021-04-04 ENCOUNTER — HOSPITAL ENCOUNTER (EMERGENCY)
Facility: HOSPITAL | Age: 86
End: 2021-04-04
Attending: EMERGENCY MEDICINE | Admitting: EMERGENCY MEDICINE
Payer: MEDICARE

## 2021-04-04 ENCOUNTER — APPOINTMENT (EMERGENCY)
Dept: RADIOLOGY | Facility: HOSPITAL | Age: 86
End: 2021-04-04
Payer: MEDICARE

## 2021-04-04 ENCOUNTER — HOSPITAL ENCOUNTER (INPATIENT)
Facility: HOSPITAL | Age: 86
LOS: 2 days | Discharge: HOME | DRG: 243 | End: 2021-04-06
Attending: ANESTHESIOLOGY | Admitting: ANESTHESIOLOGY
Payer: MEDICARE

## 2021-04-04 VITALS
DIASTOLIC BLOOD PRESSURE: 70 MMHG | SYSTOLIC BLOOD PRESSURE: 172 MMHG | WEIGHT: 134.26 LBS | OXYGEN SATURATION: 96 % | TEMPERATURE: 98.3 F | BODY MASS INDEX: 27.07 KG/M2 | RESPIRATION RATE: 20 BRPM | HEART RATE: 30 BPM | HEIGHT: 59 IN

## 2021-04-04 DIAGNOSIS — I44.2 COMPLETE HEART BLOCK (HCC): Primary | ICD-10-CM

## 2021-04-04 DIAGNOSIS — Z95.0 S/P PLACEMENT OF CARDIAC PACEMAKER: ICD-10-CM

## 2021-04-04 DIAGNOSIS — I44.2 THIRD DEGREE AV BLOCK (HCC): Primary | ICD-10-CM

## 2021-04-04 PROBLEM — I10 BENIGN ESSENTIAL HTN: Status: ACTIVE | Noted: 2021-04-04

## 2021-04-04 PROBLEM — N17.9 AKI (ACUTE KIDNEY INJURY) (HCC): Status: ACTIVE | Noted: 2021-04-04

## 2021-04-04 LAB
ALBUMIN SERPL BCP-MCNC: 3.6 G/DL (ref 3.5–5)
ALP SERPL-CCNC: 72 U/L (ref 46–116)
ALT SERPL W P-5'-P-CCNC: 42 U/L (ref 12–78)
ANION GAP SERPL CALCULATED.3IONS-SCNC: 12 MMOL/L (ref 4–13)
AST SERPL W P-5'-P-CCNC: 35 U/L (ref 5–45)
BASE EXCESS BLDA CALC-SCNC: 0 MMOL/L (ref -2–3)
BASOPHILS # BLD AUTO: 0.02 THOUSANDS/ΜL (ref 0–0.1)
BASOPHILS NFR BLD AUTO: 0 % (ref 0–1)
BILIRUB SERPL-MCNC: 0.49 MG/DL (ref 0.2–1)
BUN SERPL-MCNC: 51 MG/DL (ref 5–25)
CALCIUM SERPL-MCNC: 10 MG/DL (ref 8.3–10.1)
CHLORIDE SERPL-SCNC: 93 MMOL/L (ref 100–108)
CO2 SERPL-SCNC: 26 MMOL/L (ref 21–32)
CREAT SERPL-MCNC: 2.35 MG/DL (ref 0.6–1.3)
EOSINOPHIL # BLD AUTO: 0.04 THOUSAND/ΜL (ref 0–0.61)
EOSINOPHIL NFR BLD AUTO: 1 % (ref 0–6)
ERYTHROCYTE [DISTWIDTH] IN BLOOD BY AUTOMATED COUNT: 12.3 % (ref 11.6–15.1)
GFR SERPL CREATININE-BSD FRML MDRD: 18 ML/MIN/1.73SQ M
GLUCOSE SERPL-MCNC: 173 MG/DL (ref 65–140)
GLUCOSE SERPL-MCNC: 181 MG/DL (ref 65–140)
HCO3 BLDA-SCNC: 25.5 MMOL/L (ref 24–30)
HCT VFR BLD AUTO: 40.4 % (ref 34.8–46.1)
HCT VFR BLD CALC: 42 % (ref 34.8–46.1)
HGB BLD-MCNC: 13.6 G/DL (ref 11.5–15.4)
HGB BLDA-MCNC: 14.3 G/DL (ref 11.5–15.4)
IMM GRANULOCYTES # BLD AUTO: 0.03 THOUSAND/UL (ref 0–0.2)
IMM GRANULOCYTES NFR BLD AUTO: 0 % (ref 0–2)
LYMPHOCYTES # BLD AUTO: 2.84 THOUSANDS/ΜL (ref 0.6–4.47)
LYMPHOCYTES NFR BLD AUTO: 40 % (ref 14–44)
MAGNESIUM SERPL-MCNC: 2.3 MG/DL (ref 1.6–2.6)
MCH RBC QN AUTO: 32.5 PG (ref 26.8–34.3)
MCHC RBC AUTO-ENTMCNC: 33.7 G/DL (ref 31.4–37.4)
MCV RBC AUTO: 96 FL (ref 82–98)
MONOCYTES # BLD AUTO: 0.96 THOUSAND/ΜL (ref 0.17–1.22)
MONOCYTES NFR BLD AUTO: 14 % (ref 4–12)
NEUTROPHILS # BLD AUTO: 3.17 THOUSANDS/ΜL (ref 1.85–7.62)
NEUTS SEG NFR BLD AUTO: 45 % (ref 43–75)
NRBC BLD AUTO-RTO: 0 /100 WBCS
PCO2 BLD: 27 MMOL/L (ref 21–32)
PCO2 BLD: 44.2 MM HG (ref 42–50)
PH BLD: 7.37 [PH] (ref 7.3–7.4)
PLATELET # BLD AUTO: 190 THOUSANDS/UL (ref 149–390)
PMV BLD AUTO: 10.1 FL (ref 8.9–12.7)
PO2 BLD: 19 MM HG (ref 35–45)
POTASSIUM BLD-SCNC: 5.3 MMOL/L (ref 3.5–5.3)
POTASSIUM SERPL-SCNC: 5.2 MMOL/L (ref 3.5–5.3)
PROT SERPL-MCNC: 7.2 G/DL (ref 6.4–8.2)
RBC # BLD AUTO: 4.19 MILLION/UL (ref 3.81–5.12)
SAO2 % BLD FROM PO2: 28 % (ref 60–85)
SODIUM BLD-SCNC: 128 MMOL/L (ref 136–145)
SODIUM SERPL-SCNC: 131 MMOL/L (ref 136–145)
SPECIMEN SOURCE: ABNORMAL
TROPONIN I SERPL-MCNC: 0.03 NG/ML
TSH SERPL DL<=0.05 MIU/L-ACNC: 5.08 UIU/ML (ref 0.36–3.74)
WBC # BLD AUTO: 7.06 THOUSAND/UL (ref 4.31–10.16)

## 2021-04-04 PROCEDURE — 83735 ASSAY OF MAGNESIUM: CPT | Performed by: PHYSICIAN ASSISTANT

## 2021-04-04 PROCEDURE — 71045 X-RAY EXAM CHEST 1 VIEW: CPT

## 2021-04-04 PROCEDURE — 82803 BLOOD GASES ANY COMBINATION: CPT

## 2021-04-04 PROCEDURE — 85025 COMPLETE CBC W/AUTO DIFF WBC: CPT | Performed by: EMERGENCY MEDICINE

## 2021-04-04 PROCEDURE — 84132 ASSAY OF SERUM POTASSIUM: CPT

## 2021-04-04 PROCEDURE — 1123F ACP DISCUSS/DSCN MKR DOCD: CPT | Performed by: INTERNAL MEDICINE

## 2021-04-04 PROCEDURE — 85014 HEMATOCRIT: CPT

## 2021-04-04 PROCEDURE — 84484 ASSAY OF TROPONIN QUANT: CPT | Performed by: PHYSICIAN ASSISTANT

## 2021-04-04 PROCEDURE — 83735 ASSAY OF MAGNESIUM: CPT | Performed by: EMERGENCY MEDICINE

## 2021-04-04 PROCEDURE — 99285 EMERGENCY DEPT VISIT HI MDM: CPT

## 2021-04-04 PROCEDURE — 84295 ASSAY OF SERUM SODIUM: CPT

## 2021-04-04 PROCEDURE — 80053 COMPREHEN METABOLIC PANEL: CPT | Performed by: PHYSICIAN ASSISTANT

## 2021-04-04 PROCEDURE — 80053 COMPREHEN METABOLIC PANEL: CPT | Performed by: EMERGENCY MEDICINE

## 2021-04-04 PROCEDURE — 84443 ASSAY THYROID STIM HORMONE: CPT | Performed by: EMERGENCY MEDICINE

## 2021-04-04 PROCEDURE — 93005 ELECTROCARDIOGRAM TRACING: CPT

## 2021-04-04 PROCEDURE — 83880 ASSAY OF NATRIURETIC PEPTIDE: CPT | Performed by: PHYSICIAN ASSISTANT

## 2021-04-04 PROCEDURE — 85025 COMPLETE CBC W/AUTO DIFF WBC: CPT | Performed by: PHYSICIAN ASSISTANT

## 2021-04-04 PROCEDURE — 82947 ASSAY GLUCOSE BLOOD QUANT: CPT

## 2021-04-04 PROCEDURE — 84484 ASSAY OF TROPONIN QUANT: CPT | Performed by: EMERGENCY MEDICINE

## 2021-04-04 PROCEDURE — 36415 COLL VENOUS BLD VENIPUNCTURE: CPT | Performed by: EMERGENCY MEDICINE

## 2021-04-04 PROCEDURE — 99291 CRITICAL CARE FIRST HOUR: CPT | Performed by: EMERGENCY MEDICINE

## 2021-04-04 PROCEDURE — 84100 ASSAY OF PHOSPHORUS: CPT | Performed by: PHYSICIAN ASSISTANT

## 2021-04-04 RX ORDER — MULTIVITAMIN
1 TABLET ORAL DAILY
COMMUNITY

## 2021-04-04 RX ORDER — HEPARIN SODIUM 5000 [USP'U]/ML
5000 INJECTION, SOLUTION INTRAVENOUS; SUBCUTANEOUS EVERY 8 HOURS SCHEDULED
Status: DISCONTINUED | OUTPATIENT
Start: 2021-04-04 | End: 2021-04-06 | Stop reason: HOSPADM

## 2021-04-04 RX ORDER — SODIUM CHLORIDE 9 MG/ML
75 INJECTION, SOLUTION INTRAVENOUS CONTINUOUS
Status: DISCONTINUED | OUTPATIENT
Start: 2021-04-04 | End: 2021-04-04 | Stop reason: HOSPADM

## 2021-04-04 RX ORDER — CHLORHEXIDINE GLUCONATE 0.12 MG/ML
15 RINSE ORAL EVERY 12 HOURS SCHEDULED
Status: DISCONTINUED | OUTPATIENT
Start: 2021-04-04 | End: 2021-04-05

## 2021-04-04 RX ADMIN — HEPARIN SODIUM 5000 UNITS: 5000 INJECTION INTRAVENOUS; SUBCUTANEOUS at 23:52

## 2021-04-04 RX ADMIN — SODIUM CHLORIDE 75 ML/HR: 0.9 INJECTION, SOLUTION INTRAVENOUS at 22:45

## 2021-04-04 RX ADMIN — CHLORHEXIDINE GLUCONATE 0.12% ORAL RINSE 15 ML: 1.2 LIQUID ORAL at 23:53

## 2021-04-05 ENCOUNTER — APPOINTMENT (INPATIENT)
Dept: NON INVASIVE DIAGNOSTICS | Facility: HOSPITAL | Age: 86
DRG: 243 | End: 2021-04-05
Payer: MEDICARE

## 2021-04-05 ENCOUNTER — APPOINTMENT (INPATIENT)
Dept: RADIOLOGY | Facility: HOSPITAL | Age: 86
DRG: 243 | End: 2021-04-05
Payer: MEDICARE

## 2021-04-05 PROBLEM — M06.9 RHEUMATOID ARTHRITIS (HCC): Status: ACTIVE | Noted: 2021-04-05

## 2021-04-05 LAB
ALBUMIN SERPL BCP-MCNC: 3.6 G/DL (ref 3.5–5)
ALP SERPL-CCNC: 58 U/L (ref 46–116)
ALT SERPL W P-5'-P-CCNC: 37 U/L (ref 12–78)
ANION GAP SERPL CALCULATED.3IONS-SCNC: 10 MMOL/L (ref 4–13)
ANION GAP SERPL CALCULATED.3IONS-SCNC: 9 MMOL/L (ref 4–13)
AST SERPL W P-5'-P-CCNC: 34 U/L (ref 5–45)
ATRIAL RATE: 30 BPM
ATRIAL RATE: 62 BPM
ATRIAL RATE: 86 BPM
ATRIAL RATE: 92 BPM
BASOPHILS # BLD AUTO: 0.01 THOUSANDS/ΜL (ref 0–0.1)
BASOPHILS # BLD AUTO: 0.02 THOUSANDS/ΜL (ref 0–0.1)
BASOPHILS NFR BLD AUTO: 0 % (ref 0–1)
BASOPHILS NFR BLD AUTO: 0 % (ref 0–1)
BILIRUB SERPL-MCNC: 0.34 MG/DL (ref 0.2–1)
BUN SERPL-MCNC: 49 MG/DL (ref 5–25)
BUN SERPL-MCNC: 50 MG/DL (ref 5–25)
CALCIUM SERPL-MCNC: 9.4 MG/DL (ref 8.3–10.1)
CALCIUM SERPL-MCNC: 9.4 MG/DL (ref 8.3–10.1)
CHLORIDE SERPL-SCNC: 94 MMOL/L (ref 100–108)
CHLORIDE SERPL-SCNC: 97 MMOL/L (ref 100–108)
CO2 SERPL-SCNC: 24 MMOL/L (ref 21–32)
CO2 SERPL-SCNC: 24 MMOL/L (ref 21–32)
CREAT SERPL-MCNC: 1.98 MG/DL (ref 0.6–1.3)
CREAT SERPL-MCNC: 2.08 MG/DL (ref 0.6–1.3)
EOSINOPHIL # BLD AUTO: 0.02 THOUSAND/ΜL (ref 0–0.61)
EOSINOPHIL # BLD AUTO: 0.03 THOUSAND/ΜL (ref 0–0.61)
EOSINOPHIL NFR BLD AUTO: 0 % (ref 0–6)
EOSINOPHIL NFR BLD AUTO: 1 % (ref 0–6)
ERYTHROCYTE [DISTWIDTH] IN BLOOD BY AUTOMATED COUNT: 12.2 % (ref 11.6–15.1)
ERYTHROCYTE [DISTWIDTH] IN BLOOD BY AUTOMATED COUNT: 12.3 % (ref 11.6–15.1)
GFR SERPL CREATININE-BSD FRML MDRD: 21 ML/MIN/1.73SQ M
GFR SERPL CREATININE-BSD FRML MDRD: 22 ML/MIN/1.73SQ M
GLUCOSE SERPL-MCNC: 102 MG/DL (ref 65–140)
GLUCOSE SERPL-MCNC: 118 MG/DL (ref 65–140)
HCT VFR BLD AUTO: 37.6 % (ref 34.8–46.1)
HCT VFR BLD AUTO: 38.8 % (ref 34.8–46.1)
HGB BLD-MCNC: 12.4 G/DL (ref 11.5–15.4)
HGB BLD-MCNC: 12.6 G/DL (ref 11.5–15.4)
IMM GRANULOCYTES # BLD AUTO: 0.01 THOUSAND/UL (ref 0–0.2)
IMM GRANULOCYTES # BLD AUTO: 0.02 THOUSAND/UL (ref 0–0.2)
IMM GRANULOCYTES NFR BLD AUTO: 0 % (ref 0–2)
IMM GRANULOCYTES NFR BLD AUTO: 0 % (ref 0–2)
LYMPHOCYTES # BLD AUTO: 2.18 THOUSANDS/ΜL (ref 0.6–4.47)
LYMPHOCYTES # BLD AUTO: 3.46 THOUSANDS/ΜL (ref 0.6–4.47)
LYMPHOCYTES NFR BLD AUTO: 34 % (ref 14–44)
LYMPHOCYTES NFR BLD AUTO: 56 % (ref 14–44)
MAGNESIUM SERPL-MCNC: 2.4 MG/DL (ref 1.6–2.6)
MAGNESIUM SERPL-MCNC: 2.5 MG/DL (ref 1.6–2.6)
MCH RBC QN AUTO: 31.3 PG (ref 26.8–34.3)
MCH RBC QN AUTO: 31.6 PG (ref 26.8–34.3)
MCHC RBC AUTO-ENTMCNC: 32.5 G/DL (ref 31.4–37.4)
MCHC RBC AUTO-ENTMCNC: 33 G/DL (ref 31.4–37.4)
MCV RBC AUTO: 96 FL (ref 82–98)
MCV RBC AUTO: 96 FL (ref 82–98)
MONOCYTES # BLD AUTO: 0.9 THOUSAND/ΜL (ref 0.17–1.22)
MONOCYTES # BLD AUTO: 0.93 THOUSAND/ΜL (ref 0.17–1.22)
MONOCYTES NFR BLD AUTO: 15 % (ref 4–12)
MONOCYTES NFR BLD AUTO: 15 % (ref 4–12)
NEUTROPHILS # BLD AUTO: 1.76 THOUSANDS/ΜL (ref 1.85–7.62)
NEUTROPHILS # BLD AUTO: 3.16 THOUSANDS/ΜL (ref 1.85–7.62)
NEUTS SEG NFR BLD AUTO: 29 % (ref 43–75)
NEUTS SEG NFR BLD AUTO: 50 % (ref 43–75)
NRBC BLD AUTO-RTO: 0 /100 WBCS
NRBC BLD AUTO-RTO: 0 /100 WBCS
NT-PROBNP SERPL-MCNC: 2370 PG/ML
P AXIS: 55 DEGREES
P AXIS: 58 DEGREES
P AXIS: 65 DEGREES
P AXIS: 68 DEGREES
PHOSPHATE SERPL-MCNC: 5.2 MG/DL (ref 2.3–4.1)
PHOSPHATE SERPL-MCNC: 5.3 MG/DL (ref 2.3–4.1)
PLATELET # BLD AUTO: 155 THOUSANDS/UL (ref 149–390)
PLATELET # BLD AUTO: 159 THOUSANDS/UL (ref 149–390)
PMV BLD AUTO: 10.1 FL (ref 8.9–12.7)
PMV BLD AUTO: 10.4 FL (ref 8.9–12.7)
POTASSIUM SERPL-SCNC: 4.6 MMOL/L (ref 3.5–5.3)
POTASSIUM SERPL-SCNC: 5 MMOL/L (ref 3.5–5.3)
PR INTERVAL: 163 MS
PROT SERPL-MCNC: 6.7 G/DL (ref 6.4–8.2)
QRS AXIS: 25 DEGREES
QRS AXIS: 32 DEGREES
QRS AXIS: 46 DEGREES
QRS AXIS: 54 DEGREES
QRSD INTERVAL: 108 MS
QRSD INTERVAL: 68 MS
QRSD INTERVAL: 68 MS
QRSD INTERVAL: 83 MS
QT INTERVAL: 463 MS
QT INTERVAL: 648 MS
QT INTERVAL: 654 MS
QT INTERVAL: 658 MS
QTC INTERVAL: 457 MS
QTC INTERVAL: 462 MS
QTC INTERVAL: 464 MS
QTC INTERVAL: 471 MS
RBC # BLD AUTO: 3.92 MILLION/UL (ref 3.81–5.12)
RBC # BLD AUTO: 4.03 MILLION/UL (ref 3.81–5.12)
SODIUM SERPL-SCNC: 128 MMOL/L (ref 136–145)
SODIUM SERPL-SCNC: 130 MMOL/L (ref 136–145)
T WAVE AXIS: 15 DEGREES
T WAVE AXIS: 28 DEGREES
T WAVE AXIS: 53 DEGREES
T WAVE AXIS: 61 DEGREES
TROPONIN I SERPL-MCNC: 0.03 NG/ML
TROPONIN I SERPL-MCNC: 0.03 NG/ML
VENTRICULAR RATE: 30 BPM
VENTRICULAR RATE: 62 BPM
WBC # BLD AUTO: 6.16 THOUSAND/UL (ref 4.31–10.16)
WBC # BLD AUTO: 6.34 THOUSAND/UL (ref 4.31–10.16)

## 2021-04-05 PROCEDURE — 93325 DOPPLER ECHO COLOR FLOW MAPG: CPT | Performed by: INTERNAL MEDICINE

## 2021-04-05 PROCEDURE — 93010 ELECTROCARDIOGRAM REPORT: CPT | Performed by: INTERNAL MEDICINE

## 2021-04-05 PROCEDURE — 83735 ASSAY OF MAGNESIUM: CPT | Performed by: PHYSICIAN ASSISTANT

## 2021-04-05 PROCEDURE — 02HK3JZ INSERTION OF PACEMAKER LEAD INTO RIGHT VENTRICLE, PERCUTANEOUS APPROACH: ICD-10-PCS | Performed by: INTERNAL MEDICINE

## 2021-04-05 PROCEDURE — 99292 CRITICAL CARE ADDL 30 MIN: CPT | Performed by: ANESTHESIOLOGY

## 2021-04-05 PROCEDURE — 99291 CRITICAL CARE FIRST HOUR: CPT | Performed by: PHYSICIAN ASSISTANT

## 2021-04-05 PROCEDURE — 93321 DOPPLER ECHO F-UP/LMTD STD: CPT | Performed by: INTERNAL MEDICINE

## 2021-04-05 PROCEDURE — C1769 GUIDE WIRE: HCPCS

## 2021-04-05 PROCEDURE — 02H63JZ INSERTION OF PACEMAKER LEAD INTO RIGHT ATRIUM, PERCUTANEOUS APPROACH: ICD-10-PCS | Performed by: INTERNAL MEDICINE

## 2021-04-05 PROCEDURE — 84100 ASSAY OF PHOSPHORUS: CPT | Performed by: PHYSICIAN ASSISTANT

## 2021-04-05 PROCEDURE — 33208 INSRT HEART PM ATRIAL & VENT: CPT

## 2021-04-05 PROCEDURE — 80048 BASIC METABOLIC PNL TOTAL CA: CPT | Performed by: PHYSICIAN ASSISTANT

## 2021-04-05 PROCEDURE — C1898 LEAD, PMKR, OTHER THAN TRANS: HCPCS

## 2021-04-05 PROCEDURE — 71045 X-RAY EXAM CHEST 1 VIEW: CPT

## 2021-04-05 PROCEDURE — 33208 INSRT HEART PM ATRIAL & VENT: CPT | Performed by: INTERNAL MEDICINE

## 2021-04-05 PROCEDURE — 84484 ASSAY OF TROPONIN QUANT: CPT | Performed by: PHYSICIAN ASSISTANT

## 2021-04-05 PROCEDURE — 0JH606Z INSERTION OF PACEMAKER, DUAL CHAMBER INTO CHEST SUBCUTANEOUS TISSUE AND FASCIA, OPEN APPROACH: ICD-10-PCS | Performed by: INTERNAL MEDICINE

## 2021-04-05 PROCEDURE — 93308 TTE F-UP OR LMTD: CPT | Performed by: INTERNAL MEDICINE

## 2021-04-05 PROCEDURE — 99223 1ST HOSP IP/OBS HIGH 75: CPT | Performed by: INTERNAL MEDICINE

## 2021-04-05 PROCEDURE — C1892 INTRO/SHEATH,FIXED,PEEL-AWAY: HCPCS

## 2021-04-05 PROCEDURE — C1785 PMKR, DUAL, RATE-RESP: HCPCS

## 2021-04-05 PROCEDURE — 93308 TTE F-UP OR LMTD: CPT

## 2021-04-05 PROCEDURE — 93005 ELECTROCARDIOGRAM TRACING: CPT

## 2021-04-05 PROCEDURE — B5181ZA FLUOROSCOPY OF SUPERIOR VENA CAVA USING LOW OSMOLAR CONTRAST, GUIDANCE: ICD-10-PCS | Performed by: INTERNAL MEDICINE

## 2021-04-05 PROCEDURE — 85025 COMPLETE CBC W/AUTO DIFF WBC: CPT | Performed by: PHYSICIAN ASSISTANT

## 2021-04-05 RX ORDER — OXYCODONE HYDROCHLORIDE 5 MG/1
2.5 TABLET ORAL EVERY 4 HOURS PRN
Status: DISCONTINUED | OUTPATIENT
Start: 2021-04-05 | End: 2021-04-06 | Stop reason: HOSPADM

## 2021-04-05 RX ORDER — SODIUM CHLORIDE 9 MG/ML
INJECTION, SOLUTION INTRAVENOUS CONTINUOUS PRN
Status: DISCONTINUED | OUTPATIENT
Start: 2021-04-05 | End: 2021-04-05

## 2021-04-05 RX ORDER — ATORVASTATIN CALCIUM 20 MG/1
20 TABLET, FILM COATED ORAL
Status: DISCONTINUED | OUTPATIENT
Start: 2021-04-05 | End: 2021-04-06 | Stop reason: HOSPADM

## 2021-04-05 RX ORDER — PREDNISONE 2.5 MG
2.5 TABLET ORAL DAILY
Status: DISCONTINUED | OUTPATIENT
Start: 2021-04-05 | End: 2021-04-06 | Stop reason: HOSPADM

## 2021-04-05 RX ORDER — PROPOFOL 10 MG/ML
INJECTION, EMULSION INTRAVENOUS CONTINUOUS PRN
Status: DISCONTINUED | OUTPATIENT
Start: 2021-04-05 | End: 2021-04-05

## 2021-04-05 RX ORDER — OXYCODONE HYDROCHLORIDE 5 MG/1
5 TABLET ORAL EVERY 4 HOURS PRN
Status: DISCONTINUED | OUTPATIENT
Start: 2021-04-05 | End: 2021-04-06 | Stop reason: HOSPADM

## 2021-04-05 RX ORDER — KETAMINE HCL IN NACL, ISO-OSM 100MG/10ML
SYRINGE (ML) INJECTION AS NEEDED
Status: DISCONTINUED | OUTPATIENT
Start: 2021-04-05 | End: 2021-04-05

## 2021-04-05 RX ORDER — HYDRALAZINE HYDROCHLORIDE 20 MG/ML
5 INJECTION INTRAMUSCULAR; INTRAVENOUS EVERY 6 HOURS PRN
Status: DISCONTINUED | OUTPATIENT
Start: 2021-04-05 | End: 2021-04-06 | Stop reason: HOSPADM

## 2021-04-05 RX ORDER — ACETAMINOPHEN 325 MG/1
650 TABLET ORAL EVERY 6 HOURS PRN
Status: DISCONTINUED | OUTPATIENT
Start: 2021-04-05 | End: 2021-04-06 | Stop reason: HOSPADM

## 2021-04-05 RX ORDER — LIDOCAINE HYDROCHLORIDE 10 MG/ML
INJECTION, SOLUTION EPIDURAL; INFILTRATION; INTRACAUDAL; PERINEURAL CODE/TRAUMA/SEDATION MEDICATION
Status: COMPLETED | OUTPATIENT
Start: 2021-04-05 | End: 2021-04-05

## 2021-04-05 RX ORDER — ONDANSETRON 2 MG/ML
INJECTION INTRAMUSCULAR; INTRAVENOUS AS NEEDED
Status: DISCONTINUED | OUTPATIENT
Start: 2021-04-05 | End: 2021-04-05

## 2021-04-05 RX ORDER — CEFAZOLIN SODIUM 1 G/50ML
1000 SOLUTION INTRAVENOUS ONCE
Status: DISCONTINUED | OUTPATIENT
Start: 2021-04-05 | End: 2021-04-05

## 2021-04-05 RX ORDER — CEFAZOLIN SODIUM 1 G/50ML
SOLUTION INTRAVENOUS AS NEEDED
Status: DISCONTINUED | OUTPATIENT
Start: 2021-04-05 | End: 2021-04-05

## 2021-04-05 RX ORDER — FENTANYL CITRATE 50 UG/ML
INJECTION, SOLUTION INTRAMUSCULAR; INTRAVENOUS AS NEEDED
Status: DISCONTINUED | OUTPATIENT
Start: 2021-04-05 | End: 2021-04-05

## 2021-04-05 RX ORDER — GENTAMICIN SULFATE 40 MG/ML
INJECTION, SOLUTION INTRAMUSCULAR; INTRAVENOUS CODE/TRAUMA/SEDATION MEDICATION
Status: COMPLETED | OUTPATIENT
Start: 2021-04-05 | End: 2021-04-05

## 2021-04-05 RX ADMIN — ONDANSETRON 4 MG: 2 INJECTION INTRAMUSCULAR; INTRAVENOUS at 09:44

## 2021-04-05 RX ADMIN — LIDOCAINE HYDROCHLORIDE 20 ML: 10 INJECTION, SOLUTION EPIDURAL; INFILTRATION; INTRACAUDAL; PERINEURAL at 09:21

## 2021-04-05 RX ADMIN — HYDRALAZINE HYDROCHLORIDE 5 MG: 20 INJECTION, SOLUTION INTRAMUSCULAR; INTRAVENOUS at 20:11

## 2021-04-05 RX ADMIN — PREDNISONE 2.5 MG: 2.5 TABLET ORAL at 13:30

## 2021-04-05 RX ADMIN — Medication 5 MG: at 09:33

## 2021-04-05 RX ADMIN — SODIUM CHLORIDE: 9 INJECTION, SOLUTION INTRAVENOUS at 08:50

## 2021-04-05 RX ADMIN — FENTANYL CITRATE 12.5 MCG: 50 INJECTION INTRAMUSCULAR; INTRAVENOUS at 09:39

## 2021-04-05 RX ADMIN — IOHEXOL 10 ML: 350 INJECTION, SOLUTION INTRAVENOUS at 09:19

## 2021-04-05 RX ADMIN — Medication 15 MG: at 09:01

## 2021-04-05 RX ADMIN — ACETAMINOPHEN 650 MG: 325 TABLET, FILM COATED ORAL at 13:27

## 2021-04-05 RX ADMIN — HYDRALAZINE HYDROCHLORIDE 5 MG: 20 INJECTION, SOLUTION INTRAMUSCULAR; INTRAVENOUS at 13:28

## 2021-04-05 RX ADMIN — ATORVASTATIN CALCIUM 20 MG: 20 TABLET, FILM COATED ORAL at 16:00

## 2021-04-05 RX ADMIN — HEPARIN SODIUM 5000 UNITS: 5000 INJECTION INTRAVENOUS; SUBCUTANEOUS at 05:09

## 2021-04-05 RX ADMIN — CHLORHEXIDINE GLUCONATE 0.12% ORAL RINSE 15 ML: 1.2 LIQUID ORAL at 08:07

## 2021-04-05 RX ADMIN — CEFAZOLIN SODIUM 1000 MG: 1 SOLUTION INTRAVENOUS at 09:03

## 2021-04-05 RX ADMIN — HEPARIN SODIUM 5000 UNITS: 5000 INJECTION INTRAVENOUS; SUBCUTANEOUS at 16:00

## 2021-04-05 RX ADMIN — GENTAMICIN SULFATE 80 MG: 40 INJECTION, SOLUTION INTRAMUSCULAR; INTRAVENOUS at 09:44

## 2021-04-05 RX ADMIN — FENTANYL CITRATE 25 MCG: 50 INJECTION INTRAMUSCULAR; INTRAVENOUS at 09:01

## 2021-04-05 RX ADMIN — HEPARIN SODIUM 5000 UNITS: 5000 INJECTION INTRAVENOUS; SUBCUTANEOUS at 21:14

## 2021-04-05 RX ADMIN — PROPOFOL 20 MCG/KG/MIN: 10 INJECTION, EMULSION INTRAVENOUS at 09:01

## 2021-04-05 RX ADMIN — OXYCODONE HYDROCHLORIDE 5 MG: 5 TABLET ORAL at 20:11

## 2021-04-05 NOTE — ANESTHESIA PREPROCEDURE EVALUATION
Procedure:  CARDIAC EPS/PACER IMPLANT    Relevant Problems   CARDIO   (+) Benign essential HTN   (+) Complete heart block (HCC)      /RENAL   (+) EVANGELINA (acute kidney injury) ([facility] [address] )        Physical Exam    Airway    Mallampati score: II  TM Distance: >3 FB  Neck ROM: full     Dental   upper dentures and lower dentures,     Cardiovascular      Pulmonary      Other Findings        Anesthesia Plan  ASA Score- 4     Anesthesia Type- IV sedation with anesthesia with ASA Monitors  Additional Monitors:   Airway Plan:           Plan Factors-Exercise tolerance (METS): >4 METS  Chart reviewed  EKG reviewed  Existing labs reviewed  Patient summary reviewed  Patient is not a current smoker  There is medical exclusion for perioperative obstructive sleep apnea risk education  Induction- intravenous  Postoperative Plan-     Informed Consent- Anesthetic plan and risks discussed with patient  I personally reviewed this patient with the CRNA  Discussed and agreed on the Anesthesia Plan with the CRNA  Arabella Alicea

## 2021-04-05 NOTE — EMTALA/ACUTE CARE TRANSFER
[address] [place] [address]  Dept: [phone]      EMTALA TRANSFER CONSENT    NAME Anand Green Fahr                                         1933                              MRN [id number]    I have been informed of my rights regarding examination, treatment, and transfer   by Dr Hoa Harris MD    Benefits: Specialized equipment and/or services available at the receiving facility (Include comment)________________________(EP, cardiology recommnedation)    Risks: Potential for delay in receiving treatment, Potential deterioration of medical condition, Loss of IV, Increased discomfort during transfer, Possible worsening of condition or death during transfer      Consent for Transfer:  I acknowledge that my medical condition has been evaluated and explained to me by the emergency department physician or other qualified medical person and/or my attending physician, who has recommended that I be transferred to the service of  Accepting Physician: Dr Chuck Mackey at    The above potential benefits of such transfer, the potential risks associated with such transfer, and the probable risks of not being transferred have been explained to me, and I fully understand them  The doctor has explained that, in my case, the benefits of transfer outweigh the risks  I agree to be transferred  I authorize the performance of emergency medical procedures and treatments upon me in both transit and upon arrival at the receiving facility  Additionally, I authorize the release of any and all medical records to the receiving facility and request they be transported with me, if possible  I understand that the safest mode of transportation during a medical emergency is an ambulance and that the Hospital advocates the use of this mode of transport   Risks of traveling to the receiving facility by car, including absence of medical control, life sustaining equipment, such as oxygen, and medical personnel has been explained to me and I fully understand them  (AMY CORRECT BOX BELOW)  [  ]  I consent to the stated transfer and to be transported by ambulance/helicopter  [  ]  I consent to the stated transfer, but refuse transportation by ambulance and accept full responsibility for my transportation by car  I understand the risks of non-ambulance transfers and I exonerate the Hospital and its staff from any deterioration in my condition that results from this refusal     X___________________________________________    DATE  21  TIME________  Signature of patient or legally responsible individual signing on patient behalf           RELATIONSHIP TO PATIENT_________________________          Provider Certification    NAME Dhruv Cobb Fahr DOB 1933                              MRN [id number]    A medical screening exam was performed on the above named patient  Based on the examination:    Condition Necessitating Transfer The encounter diagnosis was Complete heart block ([address] )      Patient Condition: The patient has been stabilized such that within reasonable medical probability, no material deterioration of the patient condition or the condition of the unborn child(elma) is likely to result from the transfer    Reason for Transfer: Level of Care needed not available at this facility(EP, cardiology recommnedation)    Transfer Requirements: Facility     · Space available and qualified personnel available for treatment as acknowledged by    · Agreed to accept transfer and to provide appropriate medical treatment as acknowledged by       Dr Henrietta Waller  · Appropriate medical records of the examination and treatment of the patient are provided at the time of transfer   [address] _______  · Transfer will be performed by qualified personnel from    and appropriate transfer equipment as required, including the use of necessary and appropriate life support measures  Provider Certification: I have examined the patient and explained the following risks and benefits of being transferred/refusing transfer to the patient/family:  General risk, such as traffic hazards, adverse weather conditions, rough terrain or turbulence, possible failure of equipment (including vehicle or aircraft), or consequences of actions of persons outside the control of the transport personnel      Based on these reasonable risks and benefits to the patient and/or the unborn child(elma), and based upon the information available at the time of the patients examination, I certify that the medical benefits reasonably to be expected from the provision of appropriate medical treatments at another medical facility outweigh the increasing risks, if any, to the individuals medical condition, and in the case of labor to the unborn child, from effecting the transfer      X____________________________________________ DATE 04/04/21        TIME_______      ORIGINAL - SEND TO MEDICAL RECORDS   COPY - SEND WITH PATIENT DURING TRANSFER

## 2021-04-05 NOTE — H&P
[facility]  H&P- Emiliano Chin 1933, 80 y o  female MRN: [id number]  Unit/Bed#: [facility] 381-54 Encounter: [id number]  Primary Care Provider: Geovani Lance DO   Date and time admitted to hospital: 4/4/2021 11:10 PM    * Complete heart block [facility])  Assessment & Plan  Patient is an 71-year-old female felt weak for 24 hours and went to the ER and was found to be in third-degree heart block pressure is holding  - EP and Cardiology consult  - patient's renal function is decreased normally at baseline is 0 8 now she is 2 35, consideration for TVP to maintain renal perfusion even when her blood pressure is appropriate her last creatinine was in March of 2021 which was normal at point she goes to \[facility]\"" also tells me she has a cardiologist not associated with [facility]'s had an echocardiogram done in 2019 had carotids checked in 2019 with less than 50% stenosis, had a fem-pop done in 1996 and cardiac catheterization in 2012 had no other information     - trend troponins x3  - monitor blood pressure  - repeat labs    Benign essential HTN  Assessment & Plan  Hold hydrochlorothiazide and Cozaar at this time  Restart when able    EVANGELINA (acute kidney injury) [facility])  Assessment & Plan  Patient's creatinine was at [facility] 0 86 on 01/2021  Creatinine are elevated to 2 35 most likely related due to severe bradycardia even maintenance of blood pressure  Ultrasound kidneys  Continue to monitor creatinine and urine output      -------------------------------------------------------------------------------------------------------------  Chief Complaint: weakness     History of Present Illness   HX and PE limited by: nothing   Emiliano Chin is a 80 y o  female with a medical history of essential hypertension hyperlipidemia, rheumatoid arthritis coronary arteriosclerosis peptic reflux disease and anemia who comes to [facility] with a complaint of weakness, patient was evaluated and LTAC, located within [facility] was sent over to [place] for EP evaluation due to bradycardia  Patient's heart rate is 28-30 her blood pressure is 144/50  Patient denies any chest pain nausea vomiting diarrhea or any other symptoms shortness of breath her only symptoms is that she feels significant weakness  [facility] is handling hypertension, was last seen 3/12/2021  Patient has rheumatoid arthritis and has an allergy to most of the rheumatoid medications has a history of for mammogram in 2021 colonoscopy in 2014 her carotids were done with less than 50% in 2019 she had a nuclear stress test in 2017  She had a cardiac catheterization done in 2012 with no results in the computer and an arterial femoral bypass in 1986  History obtained from chart review and the patient   -------------------------------------------------------------------------------------------------------------  Dispo: Admit to Critical Care     Code Status: Level 1 - Full Code  --------------------------------------------------------------------------------------------------------------  Review of Systems   Constitutional: Positive for fatigue  Negative for appetite change, diaphoresis and fever  HENT: Negative for congestion, facial swelling, rhinorrhea, sneezing and tinnitus  Eyes: Negative for photophobia, pain and discharge  Respiratory: Negative for apnea, cough and shortness of breath  Cardiovascular: Negative for chest pain and leg swelling  Gastrointestinal: Negative for abdominal distention, abdominal pain, diarrhea, nausea and vomiting  Endocrine: Negative for cold intolerance, polydipsia and polyphagia  Genitourinary: Negative for enuresis, frequency and hematuria  Musculoskeletal: Negative for arthralgias, gait problem and myalgias  Skin: Negative for color change and rash  Allergic/Immunologic: Negative for environmental allergies and food allergies  Neurological: Positive for weakness  Negative for syncope, speech difficulty, light-headedness and headaches  Hematological: Negative for adenopathy  Does not bruise/bleed easily  Psychiatric/Behavioral: Negative for behavioral problems, decreased concentration and hallucinations  The patient is not hyperactive  A 12-point, complete review of systems was reviewed and negative except as stated above     Physical Exam  Vitals signs and nursing note reviewed  Constitutional:       General: She is not in acute distress  Appearance: She is well-developed  She is not diaphoretic  HENT:      Head: Normocephalic and atraumatic  Mouth/Throat:      Pharynx: No oropharyngeal exudate  Eyes:      Conjunctiva/sclera: Conjunctivae normal       Pupils: Pupils are equal, round, and reactive to light  Neck:      Musculoskeletal: Normal range of motion and neck supple  Vascular: No JVD  Trachea: No tracheal deviation  Cardiovascular:      Rate and Rhythm: Normal rate and regular rhythm  Pulses: Normal pulses  Heart sounds: Murmur present  No friction rub  No gallop  Pulmonary:      Effort: Pulmonary effort is normal  No respiratory distress  Breath sounds: Normal breath sounds  No wheezing or rales  Chest:      Chest wall: No tenderness  Abdominal:      General: Bowel sounds are normal  There is no distension  Palpations: Abdomen is soft  Tenderness: There is no abdominal tenderness  There is no guarding  Musculoskeletal: Normal range of motion  General: No tenderness or deformity  Right lower leg: No edema  Left lower leg: No edema  Skin:     General: Skin is warm and dry  Capillary Refill: Capillary refill takes less than 2 seconds  Coloration: Skin is pale  Findings: No erythema  Neurological:      Mental Status: She is alert and oriented to person, place, and time     Psychiatric:         Behavior: Behavior normal  --------------------------------------------------------------------------------------------------------------  Vitals:   Vitals:    04/04/21 2322   BP: (!) 144/44   Pulse: (!) 28   Resp: (!) 23   Temp: 98 1 °F (36 7 °C)   TempSrc: Oral   SpO2: 96%   Weight: 61 8 kg (136 lb 3 9 oz)   Height: 4' 11" (1 499 m)     Temp  Min: 98 1 °F (36 7 °C)  Max: 98 3 °F (36 8 °C)  IBW: 43 2 kg  Height: 4' 11" (149 9 cm)  Body mass index is 27 52 kg/m²  Laboratory and Diagnostics:  Results from last 7 days   Lab Units 04/04/21 2157 04/04/21 2151   WBC Thousand/uL  --  7 06   HEMOGLOBIN g/dL  --  13 6   I STAT HEMOGLOBIN g/dl 14 3  --    HEMATOCRIT %  --  40 4   HEMATOCRIT, ISTAT % 42  --    PLATELETS Thousands/uL  --  190   NEUTROS PCT %  --  45   MONOS PCT %  --  14*     Results from last 7 days   Lab Units 04/04/21 2157 04/04/21 2151   SODIUM mmol/L  --  131*   POTASSIUM mmol/L  --  5 2   CHLORIDE mmol/L  --  93*   CO2 mmol/L  --  26   CO2, I-STAT mmol/L 27  --    ANION GAP mmol/L  --  12   BUN mg/dL  --  51*   CREATININE mg/dL  --  2 35*   CALCIUM mg/dL  --  10 0   GLUCOSE RANDOM mg/dL  --  181*   ALT U/L  --  42   AST U/L  --  35   ALK PHOS U/L  --  72   ALBUMIN g/dL  --  3 6   TOTAL BILIRUBIN mg/dL  --  0 49     Results from last 7 days   Lab Units 04/04/21 2151   MAGNESIUM mg/dL 2 3           Results from last 7 days   Lab Units 04/04/21 2151   TROPONIN I ng/mL 0 03         ABG:    VBG:          Micro:        EKG: Third-degree heart block  Imaging: I have personally reviewed pertinent reports  Historical Information   No past medical history on file    Past Surgical History:   Procedure Laterality Date    BREAST BIOPSY Left 02/18/2010    calcs    HYSTERECTOMY      age 40    OOPHORECTOMY Right     age 40     Social History   Social History     Substance and Sexual Activity   Alcohol Use Never    Frequency: Never     Social History     Substance and Sexual Activity   Drug Use Never     Social History Tobacco Use   Smoking Status Never Smoker   Smokeless Tobacco Never Used     Exercise History:   Family History:   Family History   Problem Relation Age of Onset    No Known Problems Mother     No Known Problems Father     No Known Problems Sister     No Known Problems Daughter     No Known Problems Maternal Grandmother     No Known Problems Maternal Grandfather     No Known Problems Paternal Grandmother     No Known Problems Paternal Grandfather     No Known Problems Sister     No Known Problems Son     No Known Problems Son     No Known Problems Son      I have reviewed this patient's family history and commented on sigificant items within the HPI      Medications:  Current Facility-Administered Medications   Medication Dose Route Frequency    chlorhexidine (PERIDEX) 0 12 % oral rinse 15 mL  15 mL Swish & Spit Q12H [address]    heparin (porcine) subcutaneous injection 5,000 Units  5,000 Units Subcutaneous Q8H [address]     Home medications:  Prior to Admission Medications   Prescriptions Last Dose Informant Patient Reported? Taking? Calcium-Vitamin D-Vitamin K 500-1000-40 MG-UNT-MCG CHEW   Yes No   Multiple Vitamin (multivitamin) tablet   Yes No   Sig: Take 1 tablet by mouth daily   PREDNISONE PO   Yes No   Sig: Take 2 5 mg by mouth 2 (two) times a day with meals    atorvastatin (LIPITOR) 20 mg tablet   Yes No   hydrochlorothiazide (HYDRODIURIL) 12 5 mg tablet   Yes No   losartan (COZAAR) 50 mg tablet   Yes No      Facility-Administered Medications: None     Allergies:   Allergies   Allergen Reactions    Abatacept     Acyclovir GI Intolerance    Celecoxib     Denosumab Other (See Comments)     Severe jaw pain    Hydroxychloroquine Hives    Nabumetone Hives    Pantoprazole Hives    Sunscreens        ------------------------------------------------------------------------------------------------------------  Advance Directive and Living Will:      Power of :    POLST: ------------------------------------------------------------------------------------------------------------  Anticipated Length of Stay is > 2 midnights    Care Time Delivered:   Upon my evaluation, this patient had a high probability of imminent or life-threatening deterioration due to Third-degree heart block with EVANGELINA, which required my direct attention, intervention, and personal management  I have personally provided 30 minutes (0002 to 0032) of critical care time, exclusive of procedures, teaching, family meetings, and any prior time recorded by providers other than myself  Galen Almonte PA-C        Portions of the record may have been created with voice recognition software  Occasional wrong word or "sound a like" substitutions may have occurred due to the inherent limitations of voice recognition software    Read the chart carefully and recognize, using context, where substitutions have occurred

## 2021-04-05 NOTE — CONSULTS
Consultation - Electrophysiology  Chris Rojo Fahr 80 y o  female MRN: [id number]  Unit/Bed#: [facility] 421-01 Encounter: [id number]      Consults    History of Present Illness   Physician Requesting Consult: Be Cruz MD  Reason for Consult / Principal Problem: CHB    Assessment/Plan   Assessment:  1  Complete heart block  - EF of 70% per echo 4/5/2021  - cardiac cath 2012 and nuclear stress 2017 (reported per patient with no abnormalities)  - not on AV santi blocking agents as outpatient  2  EVANGELINA, improving   - baseline creatinine 0 80 / 2 35 on admission   3  Essential hypertension  - maintained on HCTZ 12 5mg daily and losartan 50mg daily as outpatient  4  Hyperlipidemia  - maintained on atorvastatin 20mg daily   5  Peripheral artery disease  - prior fem-pop bypass 1996  6  Rheumatoid arthritis  - maintained on prednisone as outpatient    Plan:  1  Patient was transferred to [facility] for permanent pacemaker implantation due to complete heart block  Echo this morning showed EF of 70% and will therefore proceed with dual chamber pacemaker  She has no allergies to penicillin or contrast dye - Ancef will be made on call  She is left handed and will therefore place right sided device  HPI: Jt Sultana is a 80y o  year old female with essential hypertension, hyperlipidemia, peripheral artery disease, and rheumatoid arthritis  She reports having felt short of breath and weakness at home since Friday  She had attempted to just rest at home but her son finally advised her to seek further medical attention and she reported to [facility]note 3[facility] ED on 4/4 where EKG showed complete heart block  Labs revealed EVANGELINA with creatinine of 2 35 when baseline is closer to 0 80  No troponin elevation  [facility] and not found in care everywhere  Per records, she had prior echo, cath, and nuclear stress test but unable to find results   She was not maintained on any AV santi blocking agents as an outpatient  She was transferred to [place] for EP evaluation  Echo this morning showed EF of 70%  TELE:       EKG:         Review of Systems  ROS as noted above, otherwise 12 point review of systems was performed and is negative  Historical Information   History reviewed  No pertinent past medical history    Past Surgical History:   Procedure Laterality Date    BREAST BIOPSY Left 02/18/2010    calcs    HYSTERECTOMY      age 40    OOPHORECTOMY Right     age 40     Social History     Substance and Sexual Activity   Alcohol Use Never    Frequency: Never     Social History     Substance and Sexual Activity   Drug Use Never     Social History     Tobacco Use   Smoking Status Never Smoker   Smokeless Tobacco Never Used     Family History:   Family History   Problem Relation Age of Onset    No Known Problems Mother     No Known Problems Father     No Known Problems Sister     No Known Problems Daughter     No Known Problems Maternal Grandmother     No Known Problems Maternal Grandfather     No Known Problems Paternal Grandmother     No Known Problems Paternal Grandfather     No Known Problems Sister     No Known Problems Son     No Known Problems Son     No Known Problems Son        Meds/Allergies   Hospital Medications:   Current Facility-Administered Medications   Medication Dose Route Frequency    acetaminophen (TYLENOL) tablet 650 mg  650 mg Oral Q6H PRN    atorvastatin (LIPITOR) tablet 20 mg  20 mg Oral Daily With Dinner    heparin (porcine) subcutaneous injection 5,000 Units  5,000 Units Subcutaneous Q8H [facility]    hydrALAZINE (APRESOLINE) injection 5 mg  5 mg Intravenous Q6H PRN    predniSONE tablet 2 5 mg  2 5 mg Oral Daily     Home Medications:   Medications Prior to Admission   Medication    losartan (COZAAR) 50 mg tablet    atorvastatin (LIPITOR) 20 mg tablet    Calcium-Vitamin D-Vitamin K 500-1000-40 MG-UNT-MCG CHEW    hydrochlorothiazide (HYDRODIURIL) 12 5 mg tablet    Multiple Vitamin (multivitamin) tablet    PREDNISONE PO       Allergies   Allergen Reactions    Abatacept     Acyclovir GI Intolerance    Celecoxib     Denosumab Other (See Comments)     Severe jaw pain    Hydroxychloroquine Hives    Nabumetone Hives    Pantoprazole Hives    Sunscreens        Objective   Vitals: Blood pressure 143/63, pulse 63, temperature 98 °F (36 7 °C), temperature source Oral, resp  rate 18, height 4' 11" (1 499 m), weight 61 9 kg (136 lb 7 4 oz), SpO2 95 %  Orthostatic Blood Pressures      Most Recent Value   Blood Pressure  143/63 filed at 04/05/2021 1515   Patient Position - Orthostatic VS  Lying filed at 04/05/2021 1315            Intake/Output Summary (Last 24 hours) at 4/5/2021 1549  Last data filed at 4/5/2021 1250  Gross per 24 hour   Intake 580 ml   Output 450 ml   Net 130 ml       Invasive Devices     Peripheral Intravenous Line            Peripheral IV 04/04/21 Right Antecubital less than 1 day    Peripheral IV 04/05/21 Left Antecubital less than 1 day                Physical Exam   GEN: NAD, alert and oriented, well appearing  SKIN: dry without significant lesions or rashes  HEENT: NCAT, PERRL, EOMs intact  NECK: No JVD or carotid bruits appreciated  CARDIOVASCULAR: bradycardic, irregular, normal S1, S2 without murmurs, rubs, or gallops appreciated  LUNGS: Decreased breath sounds bilaterally without wheezes, rhonchi, or rales  ABDOMEN: Soft, nontender, nondistended  EXTREMITIES/VASCULAR: perfused without clubbing, cyanosis, or edema b/l  PSYCH: Normal mood and affect  NEURO: CN ll-Xll grossly intact    Lab Results: I have personally reviewed pertinent lab results      Results from last 7 days   Lab Units 04/05/21  0435 04/04/21  2353 04/04/21  2157 04/04/21  2151   WBC Thousand/uL 6 16 6 34  --  7 06   HEMOGLOBIN g/dL 12 6 12 4  --  13 6   I STAT HEMOGLOBIN g/dl  --   --  14 3  --    HEMATOCRIT % 38 8 37 6  --  40 4   HEMATOCRIT, ISTAT %  --   --  42  -- PLATELETS Thousands/uL 155 159  --  190     Results from last 7 days   Lab Units 04/05/21  0435 04/04/21 2353 04/04/21 2157 04/04/21  2151   POTASSIUM mmol/L 4 6 5 0  --  5 2   CHLORIDE mmol/L 97* 94*  --  93*   CO2 mmol/L 24 24  --  26   CO2, I-STAT mmol/L  --   --  27  --    BUN mg/dL 49* 50*  --  51*   CREATININE mg/dL 1 98* 2 08*  --  2 35*   GLUCOSE, ISTAT mg/dl  --   --  173*  --    CALCIUM mg/dL 9 4 9 4  --  10 0         Results from last 7 days   Lab Units 04/05/21 0435 04/04/21 2353 04/04/21 2151   MAGNESIUM mg/dL 2 5 2 4 2 3       Imaging: I have personally reviewed pertinent reports  ECHO: No results found for this or any previous visit  Cardiac testing:   ECHO:   No results found for this or any previous visit  No results found for this or any previous visit  CATH:  No results found for this or any previous visit  STRESS TEST:  No results found for this or any previous visit      VTE Prophylaxis: Sequential compression device (Venodyne)

## 2021-04-05 NOTE — ASSESSMENT & PLAN NOTE
Patient is an 51-year-old female felt weak for 24 hours and went to the ER and was found to be in third-degree heart block pressure is holding  - EP and Cardiology consult  - patient's renal function is decreased normally at baseline is 0 8 now she is 2 35, consideration for TVP to maintain renal perfusion even when her blood pressure is appropriate her last creatinine was in March of 2021 which was normal at point she goes to [facility] also tells me she has a cardiologist not associated with [facility] had an echocardiogram done in 2019 had carotids checked in 2019 with less than 50% stenosis, had a fem-pop done in 1996 and cardiac catheterization in 2012 had no other information     - trend troponins x3  - monitor blood pressure  - repeat labs

## 2021-04-05 NOTE — ANESTHESIA POSTPROCEDURE EVALUATION
Post-Op Assessment Note    CV Status:  Stable  Pain Score: 0    Pain management: adequate     Mental Status:  Alert and awake   Hydration Status:  Stable   PONV Controlled:  None   Airway Patency:  Patent      Post Op Vitals Reviewed: Yes      Staff: CRNA         No complications documented      BP   146/61 (81)   Temp      Pulse  81   Resp  16   SpO2   98% on RA

## 2021-04-05 NOTE — PLAN OF CARE
Problem: Potential for Falls  Goal: Patient will remain free of falls  Description: INTERVENTIONS:  - Assess patient frequently for physical needs  -  Identify cognitive and physical deficits and behaviors that affect risk of falls  -  [place] fall precautions as indicated by assessment   - Educate patient/family on patient safety including physical limitations  - Instruct patient to call for assistance with activity based on assessment  - Modify environment to reduce risk of injury  - Consider OT/PT consult to assist with strengthening/mobility  Outcome: Progressing     Problem: SAFETY ADULT  Goal: Patient will remain free of falls  Description: INTERVENTIONS:  - Assess patient frequently for physical needs  -  Identify cognitive and physical deficits and behaviors that affect risk of falls    -  [place] fall precautions as indicated by assessment   - Educate patient/family on patient safety including physical limitations  - Instruct patient to call for assistance with activity based on assessment  - Modify environment to reduce risk of injury  - Consider OT/PT consult to assist with strengthening/mobility  Outcome: Progressing  Goal: Maintain or return to baseline ADL function  Description: INTERVENTIONS:  -  Assess patient's ability to carry out ADLs; assess patient's baseline for ADL function and identify physical deficits which impact ability to perform ADLs (bathing, care of mouth/teeth, toileting, grooming, dressing, etc )  - Assess/evaluate cause of self-care deficits   - Assess range of motion  - Assess patient's mobility; develop plan if impaired  - Assess patient's need for assistive devices and provide as appropriate  - Encourage maximum independence but intervene and supervise when necessary  - Involve family in performance of ADLs  - Assess for home care needs following discharge   - Consider OT consult to assist with ADL evaluation and planning for discharge  - Provide patient education as appropriate  Outcome: Progressing  Goal: Maintain or return mobility status to optimal level  Description: INTERVENTIONS:  - Assess patient's baseline mobility status (ambulation, transfers, stairs, etc )    - Identify cognitive and physical deficits and behaviors that affect mobility  - Identify mobility aids required to assist with transfers and/or ambulation (gait belt, sit-to-stand, lift, walker, cane, etc )  - [place] fall precautions as indicated by assessment  - Record patient progress and toleration of activity level on Mobility SBAR; progress patient to next Phase/Stage  - Instruct patient to call for assistance with activity based on assessment  - Consider rehabilitation consult to assist with strengthening/weightbearing, etc   Outcome: Progressing     Problem: CARDIOVASCULAR - ADULT  Goal: Maintains optimal cardiac output and hemodynamic stability  Description: INTERVENTIONS:  - Monitor I/O, vital signs and rhythm  - Monitor for S/S and trends of decreased cardiac output  - Administer and titrate ordered vasoactive medications to optimize hemodynamic stability  - Assess quality of pulses, skin color and temperature  - Assess for signs of decreased coronary artery perfusion  - Instruct patient to report change in severity of symptoms  Outcome: Progressing  Goal: Absence of cardiac dysrhythmias or at baseline rhythm  Description: INTERVENTIONS:  - Continuous cardiac monitoring, vital signs, obtain 12 lead EKG if ordered  - Administer antiarrhythmic and heart rate control medications as ordered  - Monitor electrolytes and administer replacement therapy as ordered  Outcome: Progressing

## 2021-04-05 NOTE — ED PROVIDER NOTES
History  Chief Complaint   Patient presents with    Weakness - Generalized     feeling weak x 3 days, SOB on exertion, vomioting and nausea       History provided by:  Patient   used: No        Patient is 60-year-old female presenting to emergency department with chest burning shortness of breath nausea and lightheadedness with any activity for the last 2 days  No complaints of breath  No fevers or chills  No diarrhea  No abdominal pain  No history of cardiac disease except for hypertension  no history of heart attacks or stents  Patient bradycardic on arrival   Not on any calcium channel or beta blockers  No recent tick bites  No rashes  MDM EKG in department shows complete heart block  Will check electrolytes, troponin chest x-ray  Discussed with cardiologist, recommends transfer to [place]    Chest x-ray without any acute abnormalities  EKG done in department shows heart rate of 30,  Froylan, complete heart block, no significant ST elevations depressions, normal QRS, independently interpreted by me    Prior to Admission Medications   Prescriptions Last Dose Informant Patient Reported? Taking? Calcium-Vitamin D-Vitamin K 500-1000-40 MG-UNT-MCG CHEW 4/4/2021 at Unknown time  Yes Yes   Multiple Vitamin (multivitamin) tablet 4/4/2021 at Unknown time  Yes Yes   Sig: Take 1 tablet by mouth daily   PREDNISONE PO 4/4/2021 at Unknown time  Yes Yes   Sig: Take 2 5 mg by mouth 2 (two) times a day with meals    atorvastatin (LIPITOR) 20 mg tablet 4/4/2021 at Unknown time  Yes Yes   hydrochlorothiazide (HYDRODIURIL) 12 5 mg tablet 4/4/2021 at Unknown time  Yes Yes   losartan (COZAAR) 50 mg tablet 4/4/2021 at Unknown time  Yes Yes      Facility-Administered Medications: None       History reviewed  No pertinent past medical history      Past Surgical History:   Procedure Laterality Date    BREAST BIOPSY Left 02/18/2010    calcs    HYSTERECTOMY      age 40    OOPHORECTOMY Right     age 40 Family History   Problem Relation Age of Onset    No Known Problems Mother     No Known Problems Father     No Known Problems Sister     No Known Problems Daughter     No Known Problems Maternal Grandmother     No Known Problems Maternal Grandfather     No Known Problems Paternal Grandmother     No Known Problems Paternal Grandfather     No Known Problems Sister     No Known Problems Son     No Known Problems Son     No Known Problems Son      I have reviewed and agree with the history as documented  E-Cigarette/Vaping     E-Cigarette/Vaping Substances     Social History     Tobacco Use    Smoking status: Never Smoker    Smokeless tobacco: Never Used   Substance Use Topics    Alcohol use: Never     Frequency: Never    Drug use: Never       Review of Systems   Constitutional: Negative for chills, diaphoresis and fever  HENT: Negative for congestion and sore throat  Respiratory: Positive for shortness of breath  Negative for cough, wheezing and stridor  Cardiovascular: Positive for chest pain  Negative for palpitations and leg swelling  Gastrointestinal: Positive for nausea and vomiting  Negative for abdominal pain, blood in stool and diarrhea  Genitourinary: Negative for dysuria, frequency and urgency  Musculoskeletal: Negative for neck pain and neck stiffness  Skin: Negative for pallor and rash  Neurological: Negative for dizziness, syncope, weakness, light-headedness and headaches  All other systems reviewed and are negative  Physical Exam  Physical Exam  Vitals signs reviewed  Constitutional:       Appearance: Normal appearance  She is well-developed  HENT:      Head: Normocephalic and atraumatic  Eyes:      Extraocular Movements: Extraocular movements intact  Pupils: Pupils are equal, round, and reactive to light  Neck:      Musculoskeletal: Normal range of motion and neck supple  Cardiovascular:      Rate and Rhythm: Regular rhythm   Bradycardia present  Heart sounds: Normal heart sounds  Pulmonary:      Effort: Pulmonary effort is normal  No respiratory distress  Breath sounds: Normal breath sounds  Abdominal:      General: Bowel sounds are normal       Palpations: Abdomen is soft  Tenderness: There is no abdominal tenderness  Musculoskeletal: Normal range of motion  General: No swelling or tenderness  Skin:     General: Skin is warm and dry  Capillary Refill: Capillary refill takes less than 2 seconds  Neurological:      General: No focal deficit present  Mental Status: She is alert and oriented to person, place, and time  Vital Signs  ED Triage Vitals   Temperature Pulse Respirations Blood Pressure SpO2   04/04/21 2200 04/04/21 2134 04/04/21 2134 04/04/21 2138 04/04/21 2134   98 3 °F (36 8 °C) (!) 37 22 (!) 177/70 100 %      Temp Source Heart Rate Source Patient Position - Orthostatic VS BP Location FiO2 (%)   04/04/21 2200 04/04/21 2134 04/04/21 2200 04/04/21 2200 --   Oral Monitor Sitting Right arm       Pain Score       04/04/21 2200       No Pain           Vitals:    04/04/21 2134 04/04/21 2138 04/04/21 2200 04/04/21 2230   BP:  (!) 177/70 168/66 (!) 172/70   Pulse: (!) 37  (!) 30 (!) 30   Patient Position - Orthostatic VS:   Sitting Lying         Visual Acuity      ED Medications  Medications - No data to display    Diagnostic Studies  Results Reviewed     Procedure Component Value Units Date/Time    TSH [[id number]]  (Abnormal) Collected: 04/04/21 2151    Lab Status: Final result Specimen: Blood from Arm, Right Updated: 04/04/21 2232     TSH 3RD GENERATON 5 076 uIU/mL     Narrative:      Patients undergoing fluorescein dye angiography may retain small amounts of fluorescein in the body for 48-72 hours post procedure  Samples containing fluorescein can produce falsely depressed TSH values  If the patient had this procedure,a specimen should be resubmitted post fluorescein clearance        Magnesium [[id number]]  (Normal) Collected: 04/04/21 2151    Lab Status: Final result Specimen: Blood from Arm, Right Updated: 04/04/21 2232     Magnesium 2 3 mg/dL     Troponin I [[id number]]  (Normal) Collected: 04/04/21 2151    Lab Status: Final result Specimen: Blood from Arm, Right Updated: 04/04/21 2225     Troponin I 0 03 ng/mL     Comprehensive metabolic panel [[id number]]  (Abnormal) Collected: 04/04/21 2151    Lab Status: Final result Specimen: Blood from Arm, Right Updated: 04/04/21 2225     Sodium 131 mmol/L      Potassium 5 2 mmol/L      Chloride 93 mmol/L      CO2 26 mmol/L      ANION GAP 12 mmol/L      BUN 51 mg/dL      Creatinine 2 35 mg/dL      Glucose 181 mg/dL      Calcium 10 0 mg/dL      AST 35 U/L      ALT 42 U/L      Alkaline Phosphatase 72 U/L      Total Protein 7 2 g/dL      Albumin 3 6 g/dL      Total Bilirubin 0 49 mg/dL      eGFR 18 ml/min/1 73sq m     Narrative:      [place] guidelines for Chronic Kidney Disease (CKD):     Stage 1 with normal or high GFR (GFR > 90 mL/min/1 73 square meters)    Stage 2 Mild CKD (GFR = 60-89 mL/min/1 73 square meters)    Stage 3A Moderate CKD (GFR = 45-59 mL/min/1 73 square meters)    Stage 3B Moderate CKD (GFR = 30-44 mL/min/1 73 square meters)    Stage 4 Severe CKD (GFR = 15-29 mL/min/1 73 square meters)    Stage 5 End Stage CKD (GFR <15 mL/min/1 73 square meters)  Note: GFR calculation is accurate only with a steady state creatinine    POCT Blood Gas (CG8+) [[id number]]  (Abnormal) Collected: 04/04/21 2157    Lab Status: Final result Specimen: Venous Updated: 04/04/21 2201     ph, Sami ISTAT 7 369     pCO2, Sami i-STAT 44 2 mm HG      pO2, Sami i-STAT 19 0 mm HG      BE, i-STAT 0 mmol/L      HCO3, Sami i-STAT 25 5 mmol/L      CO2, i-STAT 27 mmol/L      O2 Sat, i-STAT 28 %      SODIUM, I-STAT 128 mmol/l      Potassium, i-STAT 5 3 mmol/L      Hct, i-STAT 42 %      Hgb, i-STAT 14 3 g/dl      Glucose, i-STAT 173 mg/dl      Specimen Type VENOUS CBC and differential [[id number]]  (Abnormal) Collected: 04/04/21 2151    Lab Status: Final result Specimen: Blood from Arm, Right Updated: 04/04/21 2156     WBC 7 06 Thousand/uL      RBC 4 19 Million/uL      Hemoglobin 13 6 g/dL      Hematocrit 40 4 %      MCV 96 fL      MCH 32 5 pg      MCHC 33 7 g/dL      RDW 12 3 %      MPV 10 1 fL      Platelets 880 Thousands/uL      nRBC 0 /100 WBCs      Neutrophils Relative 45 %      Immat GRANS % 0 %      Lymphocytes Relative 40 %      Monocytes Relative 14 %      Eosinophils Relative 1 %      Basophils Relative 0 %      Neutrophils Absolute 3 17 Thousands/µL      Immature Grans Absolute 0 03 Thousand/uL      Lymphocytes Absolute 2 84 Thousands/µL      Monocytes Absolute 0 96 Thousand/µL      Eosinophils Absolute 0 04 Thousand/µL      Basophils Absolute 0 02 Thousands/µL                  XR chest 1 view portable   Final Result by Janae Jensen MD (04/05 1012)      No acute cardiopulmonary disease                    Workstation performed: [id number]                    Procedures  CriticalCare Time  Performed by: Dillon Morrow MD  Authorized by: Dillon Morrow MD     Critical care provider statement:     Critical care time (minutes):  35    Critical care was necessary to treat or prevent imminent or life-threatening deterioration of the following conditions:  Cardiac failure and circulatory failure    Critical care was time spent personally by me on the following activities:  Obtaining history from patient or surrogate, interpretation of cardiac output measurements, ordering and performing treatments and interventions, development of treatment plan with patient or surrogate, ordering and review of laboratory studies, discussions with consultants, ordering and review of radiographic studies, discussions with primary provider, re-evaluation of patient's condition, review of old charts and examination of patient             ED Course  ED Course as of Apr 05 1633   Sun Apr 04, 2021 2159 Tiger text with EKG sent to Cardiology      [phone] with cardiologist, Dr Elyce Boast, recommends transfer to [place]  Will have fellow see patient there  2222 Discussed with Dr Nadeen Pathak, accepts patient at [place]  SBIRT 22yo+      Most Recent Value   SBIRT (24 yo +)   In order to provide better care to our patients, we are screening all of our patients for alcohol and drug use  Would it be okay to ask you these screening questions? Yes Filed at: 04/04/2021 2138   Initial Alcohol Screen: US AUDIT-C    1  How often do you have a drink containing alcohol?  0 Filed at: 04/04/2021 2138   2  How many drinks containing alcohol do you have on a typical day you are drinking? 0 Filed at: 04/04/2021 2138   3a  Male UNDER 65: How often do you have five or more drinks on one occasion? 0 Filed at: 04/04/2021 2138   3b  FEMALE Any Age, or MALE 65+: How often do you have 4 or more drinks on one occassion? 0 Filed at: 04/04/2021 2138   Audit-C Score  0 Filed at: 04/04/2021 2138   HASMUKH: How many times in the past year have you    Used an illegal drug or used a prescription medication for non-medical reasons? Never Filed at: 04/04/2021 2138                    MDM    Disposition  Final diagnoses:   Complete heart block ([address] )     Time reflects when diagnosis was documented in both MDM as applicable and the Disposition within this note     Time User Action Codes Description Comment    4/4/2021 10:23 PM Linda Hameed [I44 2] Complete heart block [facility])       ED Disposition     ED Disposition Condition Date/Time Comment    Transfer to Another Facility-In Network  Sun Apr 4, 2021 10:23 PM Charmaine Carbine A Fahr should be transferred out to [facility] Documentation      Most Recent Value   Patient Condition  The patient has been stabilized such that within reasonable medical probability, no material deterioration of the patient condition or the condition of the unborn child(elma) is likely to result from the transfer   Reason for Transfer  Level of Care needed not available at this facility [EP, cardiology recommnedation]   Benefits of Transfer  Specialized equipment and/or services available at the receiving facility (Include comment)________________________ Kala Nolasco, cardiology recommnedation]   Risks of Transfer  Potential for delay in receiving treatment, Potential deterioration of medical condition, Loss of IV, Increased discomfort during transfer, Possible worsening of condition or death during transfer   Accepting Physician  Dr Annika Aquino   Sending MD Angelic Boateng   Provider Certification  General risk, such as traffic hazards, adverse weather conditions, rough terrain or turbulence, possible failure of equipment (including vehicle or aircraft), or consequences of actions of persons outside the control of the transport personnel      Follow-up Information    None         Discharge Medication List as of 4/4/2021 10:50 PM      CONTINUE these medications which have NOT CHANGED    Details   atorvastatin (LIPITOR) 20 mg tablet Historical Med      Calcium-Vitamin D-Vitamin K 500-1000-40 MG-UNT-MCG CHEW Historical Med      hydrochlorothiazide (HYDRODIURIL) 12 5 mg tablet Starting Tue 1/21/2020, Historical Med      losartan (COZAAR) 50 mg tablet Starting Wed 11/16/2016, Historical Med      Multiple Vitamin (multivitamin) tablet Take 1 tablet by mouth daily, Historical Med      PREDNISONE PO Take 2 5 mg by mouth 2 (two) times a day with meals , Historical Med           No discharge procedures on file      PDMP Review     None          ED Provider  Electronically Signed by           Espinoza Wolfe MD  04/05/21 0812

## 2021-04-05 NOTE — PLAN OF CARE
Problem: Potential for Falls  Goal: Patient will remain free of falls  Description: INTERVENTIONS:  - Assess patient frequently for physical needs  -  Identify cognitive and physical deficits and behaviors that affect risk of falls  -  [place] fall precautions as indicated by assessment   - Educate patient/family on patient safety including physical limitations  - Instruct patient to call for assistance with activity based on assessment  - Modify environment to reduce risk of injury  - Consider OT/PT consult to assist with strengthening/mobility  Outcome: Progressing     Problem: SAFETY ADULT  Goal: Patient will remain free of falls  Description: INTERVENTIONS:  - Assess patient frequently for physical needs  -  Identify cognitive and physical deficits and behaviors that affect risk of falls    -  [place] fall precautions as indicated by assessment   - Educate patient/family on patient safety including physical limitations  - Instruct patient to call for assistance with activity based on assessment  - Modify environment to reduce risk of injury  - Consider OT/PT consult to assist with strengthening/mobility  Outcome: Progressing  Goal: Maintain or return to baseline ADL function  Description: INTERVENTIONS:  -  Assess patient's ability to carry out ADLs; assess patient's baseline for ADL function and identify physical deficits which impact ability to perform ADLs (bathing, care of mouth/teeth, toileting, grooming, dressing, etc )  - Assess/evaluate cause of self-care deficits   - Assess range of motion  - Assess patient's mobility; develop plan if impaired  - Assess patient's need for assistive devices and provide as appropriate  - Encourage maximum independence but intervene and supervise when necessary  - Involve family in performance of ADLs  - Assess for home care needs following discharge   - Consider OT consult to assist with ADL evaluation and planning for discharge  - Provide patient education as appropriate  Outcome: Progressing  Goal: Maintain or return mobility status to optimal level  Description: INTERVENTIONS:  - Assess patient's baseline mobility status (ambulation, transfers, stairs, etc )    - Identify cognitive and physical deficits and behaviors that affect mobility  - Identify mobility aids required to assist with transfers and/or ambulation (gait belt, sit-to-stand, lift, walker, cane, etc )  - [place] fall precautions as indicated by assessment  - Record patient progress and toleration of activity level on Mobility SBAR; progress patient to next Phase/Stage  - Instruct patient to call for assistance with activity based on assessment  - Consider rehabilitation consult to assist with strengthening/weightbearing, etc   Outcome: Progressing     Problem: CARDIOVASCULAR - ADULT  Goal: Maintains optimal cardiac output and hemodynamic stability  Description: INTERVENTIONS:  - Monitor I/O, vital signs and rhythm  - Monitor for S/S and trends of decreased cardiac output  - Administer and titrate ordered vasoactive medications to optimize hemodynamic stability  - Assess quality of pulses, skin color and temperature  - Assess for signs of decreased coronary artery perfusion  - Instruct patient to report change in severity of symptoms  Outcome: Progressing  Goal: Absence of cardiac dysrhythmias or at baseline rhythm  Description: INTERVENTIONS:  - Continuous cardiac monitoring, vital signs, obtain 12 lead EKG if ordered  - Administer antiarrhythmic and heart rate control medications as ordered  - Monitor electrolytes and administer replacement therapy as ordered  Outcome: Progressing     Problem: Prexisting or High Potential for Compromised Skin Integrity  Goal: Skin integrity is maintained or improved  Description: INTERVENTIONS:  - Identify patients at risk for skin breakdown  - Assess and monitor skin integrity  - Assess and monitor nutrition and hydration status  - Monitor labs   - Assess for incontinence   - Turn and reposition patient  - Assist with mobility/ambulation  - Relieve pressure over bony prominences  - Avoid friction and shearing  - Provide appropriate hygiene as needed including keeping skin clean and dry  - Evaluate need for skin moisturizer/barrier cream  - Collaborate with interdisciplinary team   - Patient/family teaching  - Consider wound care consult   Outcome: Progressing     Problem: PAIN - ADULT  Goal: Verbalizes/displays adequate comfort level or baseline comfort level  Description: Interventions:  - Encourage patient to monitor pain and request assistance  - Assess pain using appropriate pain scale  - Administer analgesics based on type and severity of pain and evaluate response  - Implement non-pharmacological measures as appropriate and evaluate response  - Consider cultural and social influences on pain and pain management  - Notify physician/advanced practitioner if interventions unsuccessful or patient reports new pain  Outcome: Progressing     Problem: INFECTION - ADULT  Goal: Absence or prevention of progression during hospitalization  Description: INTERVENTIONS:  - Assess and monitor for signs and symptoms of infection  - Monitor lab/diagnostic results  - Monitor all insertion sites, i e  indwelling lines, tubes, and drains  - Monitor endotracheal if appropriate and nasal secretions for changes in amount and color  - Lottie appropriate cooling/warming therapies per order  - Administer medications as ordered  - Instruct and encourage patient and family to use good hand hygiene technique  - Identify and instruct in appropriate isolation precautions for identified infection/condition  Outcome: Progressing  Goal: Absence of fever/infection during neutropenic period  Description: INTERVENTIONS:  - Monitor WBC    Outcome: Progressing     Problem: DISCHARGE PLANNING  Goal: Discharge to home or other facility with appropriate resources  Description: INTERVENTIONS:  - Identify barriers to discharge w/patient and caregiver  - Arrange for needed discharge resources and transportation as appropriate  - Identify discharge learning needs (meds, wound care, etc )  - Arrange for interpretive services to assist at discharge as needed  - Refer to Case Management Department for coordinating discharge planning if the patient needs post-hospital services based on physician/advanced practitioner order or complex needs related to functional status, cognitive ability, or social support system  Outcome: Progressing     Problem: Knowledge Deficit  Goal: Patient/family/caregiver demonstrates understanding of disease process, treatment plan, medications, and discharge instructions  Description: Complete learning assessment and assess knowledge base    Interventions:  - Provide teaching at level of understanding  - Provide teaching via preferred learning methods  Outcome: Progressing

## 2021-04-05 NOTE — ASSESSMENT & PLAN NOTE
· Patient's creatinine was at [facility] 0 86 on 01/2021  · Creatinine are elevated to 2 35 most likely related due to severe bradycardia   · Continue to monitor creatinine and urine output

## 2021-04-05 NOTE — ASSESSMENT & PLAN NOTE
Patient's creatinine was at [facility] 0 86 on 01/2021  Creatinine are elevated to 2 35 most likely related due to severe bradycardia even maintenance of blood pressure  Ultrasound kidneys  Continue to monitor creatinine and urine output

## 2021-04-05 NOTE — ASSESSMENT & PLAN NOTE
· Patient is an 66-year-old female felt weak for 24 hours and went to the ER and was found to be in third-degree heart block pressure is holding  · EP consult - now s/p dual chamber PPM  · Echo completed but read pending

## 2021-04-05 NOTE — PROGRESS NOTES
Progress Note - Electrophysiology  Hafsa KHAN Fahr 80 y o  female MRN: [id number]  Unit/Bed#: [facility] 421-01 Encounter: [id number]      Assessment:  1  Right sided Medtronic dual chamber pacemaker in situ  - implanted due to complete heart block by Dr Raul Austin on 4/5/2021  - EF of 70% per echo 4/5/2021  - cardiac cath 2012 and nuclear stress 2017 (reported per patient with no abnormalities)  - not on AV santi blocking agents as outpatient  2  EVANGELINA, improving   - baseline creatinine 0 80 / 2 35 on admission, now 1 3  3  Essential hypertension  - maintained on HCTZ 12 5mg daily and losartan 50mg daily as outpatient  4  Hyperlipidemia  - maintained on atorvastatin 20mg daily   5  Peripheral arterial disease  - prior fem-pop bypass 1996  6  Rheumatoid arthritis  - maintained on prednisone as outpatient    Plan:  1  Device - Patient is doing well status post pacemaker implantation  Her incision is clean, dry, and intact without evidence of hematoma or ecchymosis or signs of infection  Vital signs and labs were reviewed  Assessment of chest x-rays show proper lead placement without evidence of pneumothorax  Device interrogation showed appropriate device function with lead parameters such as sensing, threshold, and impedance being tested  2  Post care - Discharge instructions and restrictions were discussed with the patient in detail  She will also be provided with written instructions detailing what was discussed  Patient also requires a 2 week device and site check which has already been arranged and is in Epic  3  Medications - In terms of medications, no changes were made to her current regimen  Will defer treatment of other comorbidities to primary team  She reports pain at device site, improved with oxycodone  Will give small script upon discharge to post op pain management  4  Patient is stable from an EP standpoint for discharge at this time, all questions answered         Subjective/Objective   Subjective: Eladio Mae is a 80y o  year old female with complete heart block status post pacemaker, preserved LVEF, essential hypertension, hyperlipidemia, peripheral artery disease, and rheumatoid arthritis  She is hospital stay day 3 and is status post pacemaker  She reports pain at device site, relieved with oxycodone which she tolerated well  She otherwise reports no issues overnight, denies shortness of breath, palpitations, lightheadedness or dizziness       Telemetry shows normal sinus rhythm with ventricular pacing    Objective:  Vitals: /60   Pulse 74   Temp 98 °F (36 7 °C) (Oral)   Resp 18   Ht 4' 11" (1 499 m)   Wt 61 9 kg (136 lb 7 4 oz)   SpO2 95%   BMI 27 56 kg/m²     Vitals:    04/04/21 2322 04/05/21 0540   Weight: 61 8 kg (136 lb 3 9 oz) 61 9 kg (136 lb 7 4 oz)     Orthostatic Blood Pressures      Most Recent Value   Blood Pressure  126/60 filed at 04/05/2021 1545   Patient Position - Orthostatic VS  Lying filed at 04/05/2021 1315            Intake/Output Summary (Last 24 hours) at 4/5/2021 1605  Last data filed at 4/5/2021 1250  Gross per 24 hour   Intake 580 ml   Output 450 ml   Net 130 ml       Invasive Devices     Peripheral Intravenous Line            Peripheral IV 04/04/21 Right Antecubital less than 1 day    Peripheral IV 04/05/21 Left Antecubital less than 1 day                          Scheduled Meds:  Current Facility-Administered Medications   Medication Dose Route Frequency Provider Last Rate    acetaminophen  650 mg Oral Q6H PRN Farnaz Garcia MD      atorvastatin  20 mg Oral Daily With [employer] Kaptur Akua, PA-C      heparin (porcine)  5,000 Units Subcutaneous [facility] Lissa Vivar PA-C      hydrALAZINE  5 mg Intravenous Q6H PRN Farnaz Garcia MD      predniSONE  2 5 mg Oral Daily Lissa Vivar PA-C       Continuous Infusions:   PRN Meds:   acetaminophen    hydrALAZINE    Review of Systems:  ROS as noted above, otherwise 12 point review of systems was performed and is negative  Physical Exam:   GEN: NAD, alert and oriented x 3, well appearing  SKIN: dry without significant lesions or rashes  HEENT: NCAT, PERRL, EOMs intact  NECK: No JVD appreciated  CARDIOVASCULAR: RRR, normal S1, S2 without murmurs, rubs, or gallops appreciated  LUNGS: Clear to auscultation bilaterally without wheezes, rhonchi, or rales  ABDOMEN: Soft, nontender, nondistended  EXTREMITIES/VASCULAR: perfused without clubbing, cyanosis, or LE edema b/l  PSYCH: Normal mood and affect  NEURO: CN ll-Xll grossly intact              Lab Results: I have personally reviewed pertinent lab results  Results from last 7 days   Lab Units 04/05/21 0435 04/04/21 2353 04/04/21 2157 04/04/21  2151   WBC Thousand/uL 6 16 6 34  --  7 06   HEMOGLOBIN g/dL 12 6 12 4  --  13 6   I STAT HEMOGLOBIN g/dl  --   --  14 3  --    HEMATOCRIT % 38 8 37 6  --  40 4   HEMATOCRIT, ISTAT %  --   --  42  --    PLATELETS Thousands/uL 155 159  --  190     Results from last 7 days   Lab Units 04/05/21 0435 04/04/21 2353 04/04/21 2157 04/04/21  2151   POTASSIUM mmol/L 4 6 5 0  --  5 2   CHLORIDE mmol/L 97* 94*  --  93*   CO2 mmol/L 24 24  --  26   CO2, I-STAT mmol/L  --   --  27  --    BUN mg/dL 49* 50*  --  51*   CREATININE mg/dL 1 98* 2 08*  --  2 35*   GLUCOSE, ISTAT mg/dl  --   --  173*  --    CALCIUM mg/dL 9 4 9 4  --  10 0         Results from last 7 days   Lab Units 04/05/21 0435 04/04/21 2353 04/04/21 2151   MAGNESIUM mg/dL 2 5 2 4 2 3       Imaging: I have personally reviewed pertinent reports  No results found for this or any previous visit      VTE Pharmacologic Prophylaxis: Heparin subQ  VTE Mechanical Prophylaxis: sequential compression device

## 2021-04-05 NOTE — ED NOTES
No POCT troponin test cartridges available on unit  Provider made aware  Staff made aware to replenish        May Zuñiga RN  04/04/21 5930

## 2021-04-06 ENCOUNTER — APPOINTMENT (INPATIENT)
Dept: RADIOLOGY | Facility: HOSPITAL | Age: 86
DRG: 243 | End: 2021-04-06
Payer: MEDICARE

## 2021-04-06 VITALS
HEIGHT: 59 IN | TEMPERATURE: 98 F | HEART RATE: 83 BPM | SYSTOLIC BLOOD PRESSURE: 138 MMHG | DIASTOLIC BLOOD PRESSURE: 64 MMHG | BODY MASS INDEX: 26.58 KG/M2 | OXYGEN SATURATION: 95 % | RESPIRATION RATE: 12 BRPM | WEIGHT: 131.84 LBS

## 2021-04-06 PROBLEM — Z95.0 S/P PLACEMENT OF CARDIAC PACEMAKER: Status: ACTIVE | Noted: 2021-04-06

## 2021-04-06 LAB
ANION GAP SERPL CALCULATED.3IONS-SCNC: 8 MMOL/L (ref 4–13)
BUN SERPL-MCNC: 34 MG/DL (ref 5–25)
CALCIUM SERPL-MCNC: 9.3 MG/DL (ref 8.3–10.1)
CHLORIDE SERPL-SCNC: 100 MMOL/L (ref 100–108)
CO2 SERPL-SCNC: 27 MMOL/L (ref 21–32)
CREAT SERPL-MCNC: 1.36 MG/DL (ref 0.6–1.3)
ERYTHROCYTE [DISTWIDTH] IN BLOOD BY AUTOMATED COUNT: 12.5 % (ref 11.6–15.1)
GFR SERPL CREATININE-BSD FRML MDRD: 35 ML/MIN/1.73SQ M
GLUCOSE SERPL-MCNC: 78 MG/DL (ref 65–140)
HCT VFR BLD AUTO: 42.3 % (ref 34.8–46.1)
HGB BLD-MCNC: 13.9 G/DL (ref 11.5–15.4)
MAGNESIUM SERPL-MCNC: 2.2 MG/DL (ref 1.6–2.6)
MCH RBC QN AUTO: 31.5 PG (ref 26.8–34.3)
MCHC RBC AUTO-ENTMCNC: 32.9 G/DL (ref 31.4–37.4)
MCV RBC AUTO: 96 FL (ref 82–98)
PLATELET # BLD AUTO: 160 THOUSANDS/UL (ref 149–390)
PMV BLD AUTO: 10.6 FL (ref 8.9–12.7)
POTASSIUM SERPL-SCNC: 4.4 MMOL/L (ref 3.5–5.3)
RBC # BLD AUTO: 4.41 MILLION/UL (ref 3.81–5.12)
SODIUM SERPL-SCNC: 135 MMOL/L (ref 136–145)
WBC # BLD AUTO: 7.5 THOUSAND/UL (ref 4.31–10.16)

## 2021-04-06 PROCEDURE — 85027 COMPLETE CBC AUTOMATED: CPT | Performed by: HOSPITALIST

## 2021-04-06 PROCEDURE — 97166 OT EVAL MOD COMPLEX 45 MIN: CPT

## 2021-04-06 PROCEDURE — 71046 X-RAY EXAM CHEST 2 VIEWS: CPT

## 2021-04-06 PROCEDURE — 97163 PT EVAL HIGH COMPLEX 45 MIN: CPT

## 2021-04-06 PROCEDURE — 80048 BASIC METABOLIC PNL TOTAL CA: CPT | Performed by: HOSPITALIST

## 2021-04-06 PROCEDURE — 99024 POSTOP FOLLOW-UP VISIT: CPT | Performed by: PHYSICIAN ASSISTANT

## 2021-04-06 PROCEDURE — 99239 HOSP IP/OBS DSCHRG MGMT >30: CPT | Performed by: GENERAL PRACTICE

## 2021-04-06 PROCEDURE — 83735 ASSAY OF MAGNESIUM: CPT | Performed by: HOSPITALIST

## 2021-04-06 RX ORDER — OXYCODONE HYDROCHLORIDE 5 MG/1
2.5 TABLET ORAL EVERY 6 HOURS PRN
Qty: 8 TABLET | Refills: 0 | Status: SHIPPED | OUTPATIENT
Start: 2021-04-06 | End: 2021-04-16

## 2021-04-06 RX ADMIN — HEPARIN SODIUM 5000 UNITS: 5000 INJECTION INTRAVENOUS; SUBCUTANEOUS at 05:47

## 2021-04-06 RX ADMIN — OXYCODONE HYDROCHLORIDE 5 MG: 5 TABLET ORAL at 08:41

## 2021-04-06 RX ADMIN — PREDNISONE 2.5 MG: 2.5 TABLET ORAL at 08:42

## 2021-04-06 NOTE — PLAN OF CARE
Problem: Potential for Falls  Goal: Patient will remain free of falls  Description: INTERVENTIONS:  - Assess patient frequently for physical needs  -  Identify cognitive and physical deficits and behaviors that affect risk of falls  -  [place] fall precautions as indicated by assessment   - Educate patient/family on patient safety including physical limitations  - Instruct patient to call for assistance with activity based on assessment  - Modify environment to reduce risk of injury  - Consider OT/PT consult to assist with strengthening/mobility  Outcome: Progressing     Problem: SAFETY ADULT  Goal: Patient will remain free of falls  Description: INTERVENTIONS:  - Assess patient frequently for physical needs  -  Identify cognitive and physical deficits and behaviors that affect risk of falls    -  [place] fall precautions as indicated by assessment   - Educate patient/family on patient safety including physical limitations  - Instruct patient to call for assistance with activity based on assessment  - Modify environment to reduce risk of injury  - Consider OT/PT consult to assist with strengthening/mobility  Outcome: Progressing  Goal: Maintain or return to baseline ADL function  Description: INTERVENTIONS:  -  Assess patient's ability to carry out ADLs; assess patient's baseline for ADL function and identify physical deficits which impact ability to perform ADLs (bathing, care of mouth/teeth, toileting, grooming, dressing, etc )  - Assess/evaluate cause of self-care deficits   - Assess range of motion  - Assess patient's mobility; develop plan if impaired  - Assess patient's need for assistive devices and provide as appropriate  - Encourage maximum independence but intervene and supervise when necessary  - Involve family in performance of ADLs  - Assess for home care needs following discharge   - Consider OT consult to assist with ADL evaluation and planning for discharge  - Provide patient education as appropriate  Outcome: Progressing  Goal: Maintain or return mobility status to optimal level  Description: INTERVENTIONS:  - Assess patient's baseline mobility status (ambulation, transfers, stairs, etc )    - Identify cognitive and physical deficits and behaviors that affect mobility  - Identify mobility aids required to assist with transfers and/or ambulation (gait belt, sit-to-stand, lift, walker, cane, etc )  - [place] fall precautions as indicated by assessment  - Record patient progress and toleration of activity level on Mobility SBAR; progress patient to next Phase/Stage  - Instruct patient to call for assistance with activity based on assessment  - Consider rehabilitation consult to assist with strengthening/weightbearing, etc   Outcome: Progressing     Problem: CARDIOVASCULAR - ADULT  Goal: Maintains optimal cardiac output and hemodynamic stability  Description: INTERVENTIONS:  - Monitor I/O, vital signs and rhythm  - Monitor for S/S and trends of decreased cardiac output  - Administer and titrate ordered vasoactive medications to optimize hemodynamic stability  - Assess quality of pulses, skin color and temperature  - Assess for signs of decreased coronary artery perfusion  - Instruct patient to report change in severity of symptoms  Outcome: Progressing  Goal: Absence of cardiac dysrhythmias or at baseline rhythm  Description: INTERVENTIONS:  - Continuous cardiac monitoring, vital signs, obtain 12 lead EKG if ordered  - Administer antiarrhythmic and heart rate control medications as ordered  - Monitor electrolytes and administer replacement therapy as ordered  Outcome: Progressing     Problem: Prexisting or High Potential for Compromised Skin Integrity  Goal: Skin integrity is maintained or improved  Description: INTERVENTIONS:  - Identify patients at risk for skin breakdown  - Assess and monitor skin integrity  - Assess and monitor nutrition and hydration status  - Monitor labs   - Assess for incontinence   - Turn and reposition patient  - Assist with mobility/ambulation  - Relieve pressure over bony prominences  - Avoid friction and shearing  - Provide appropriate hygiene as needed including keeping skin clean and dry  - Evaluate need for skin moisturizer/barrier cream  - Collaborate with interdisciplinary team   - Patient/family teaching  - Consider wound care consult   Outcome: Progressing     Problem: PAIN - ADULT  Goal: Verbalizes/displays adequate comfort level or baseline comfort level  Description: Interventions:  - Encourage patient to monitor pain and request assistance  - Assess pain using appropriate pain scale  - Administer analgesics based on type and severity of pain and evaluate response  - Implement non-pharmacological measures as appropriate and evaluate response  - Consider cultural and social influences on pain and pain management  - Notify physician/advanced practitioner if interventions unsuccessful or patient reports new pain  Outcome: Progressing     Problem: INFECTION - ADULT  Goal: Absence or prevention of progression during hospitalization  Description: INTERVENTIONS:  - Assess and monitor for signs and symptoms of infection  - Monitor lab/diagnostic results  - Monitor all insertion sites, i e  indwelling lines, tubes, and drains  - Monitor endotracheal if appropriate and nasal secretions for changes in amount and color  - Tangent appropriate cooling/warming therapies per order  - Administer medications as ordered  - Instruct and encourage patient and family to use good hand hygiene technique  - Identify and instruct in appropriate isolation precautions for identified infection/condition  Outcome: Progressing  Goal: Absence of fever/infection during neutropenic period  Description: INTERVENTIONS:  - Monitor WBC    Outcome: Progressing     Problem: DISCHARGE PLANNING  Goal: Discharge to home or other facility with appropriate resources  Description: INTERVENTIONS:  - Identify barriers to discharge w/patient and caregiver  - Arrange for needed discharge resources and transportation as appropriate  - Identify discharge learning needs (meds, wound care, etc )  - Arrange for interpretive services to assist at discharge as needed  - Refer to Case Management Department for coordinating discharge planning if the patient needs post-hospital services based on physician/advanced practitioner order or complex needs related to functional status, cognitive ability, or social support system  Outcome: Progressing     Problem: Knowledge Deficit  Goal: Patient/family/caregiver demonstrates understanding of disease process, treatment plan, medications, and discharge instructions  Description: Complete learning assessment and assess knowledge base    Interventions:  - Provide teaching at level of understanding  - Provide teaching via preferred learning methods  Outcome: Progressing

## 2021-04-06 NOTE — DISCHARGE SUMMARY
Discharge Summary - [facility] Internal Medicine    Patient Information: Joane Awe Fahr 80 y o  female MRN: [id number]  Unit/Bed#: [facility] 421-01 Encounter: [id number]    Discharging Physician / Practitioner: Teresa Kitchen DO  PCP: Yunior Constantino DO  Admission Date: 4/4/2021  Discharge Date: 04/06/21    Disposition:     Home    Reason for Admission: CHB    Discharge Diagnoses:     Principal Problem:    Complete heart block ([facility] [address] )  Active Problems:    EVANGELINA (acute kidney injury) ([facility] [address] )    Benign essential HTN    Rheumatoid arthritis ([facility] )    S/P placement of cardiac pacemaker  Resolved Problems:    * No resolved hospital problems  *      Consultations During Hospital Stay:  · EP    Procedures Performed:     · PPM    Significant Findings / Test Results:     · EKG showed 2nd deg AVB w/ 3:1 AV conduction    Incidental Findings:   · none     Test Results Pending at Discharge (will require follow up):   · none     Outpatient Tests Requested:  · PCP should check BMP in 1-2 weeks    Complications:  none    Hospital Course:     Willy Solis is a 80 y o  female patient who originally presented to the hospital on 4/4/2021 due to [address]  Was admitted to ICU - see ICU transfer note  Pt had PPM placed  For EVANGELINA given IVF  Cr improved w/ IVF  BP meds held  BP acceptable at d/c off meds  Note occasionally SBP elevated due to pain from PPM   DBP < 80  Pt can f/u w/ PCP to see if BP meds need to be restarted  Patient was admitted ans an inpatient and discharged sooner than expected as she had a PPM placed day of admission and tolerated the procedure well  Condition at Discharge: stable     Discharge Day Visit / Exam:     Subjective:  Pain controlled w/ oxycodone    Vitals: Blood Pressure: 138/64 (04/06/21 1059)  Pulse: 83 (04/06/21 1059)  Temperature: 98 °F (36 7 °C) (04/06/21 1059)  Temp Source: Oral (04/06/21 1059)  Respirations: 12 (04/06/21 1059)  Height: 4' 11" (149 9 cm) (04/04/21 2322)  Weight - Scale: 59 8 kg (131 lb 13 4 oz) (04/06/21 0534)  SpO2: 95 % (04/06/21 1059)  Exam:   Physical Exam  HENT:      Head: Normocephalic and atraumatic  Nose: Nose normal       Mouth/Throat:      Mouth: Mucous membranes are moist    Eyes:      Extraocular Movements: Extraocular movements intact  Conjunctiva/sclera: Conjunctivae normal    Neck:      Musculoskeletal: Normal range of motion and neck supple  Cardiovascular:      Rate and Rhythm: Normal rate and regular rhythm  Pulmonary:      Effort: Pulmonary effort is normal       Breath sounds: Normal breath sounds  No wheezing or rales  Abdominal:      General: Bowel sounds are normal  There is no distension  Palpations: Abdomen is soft  Tenderness: There is no abdominal tenderness  Musculoskeletal: Normal range of motion  Right lower leg: No edema  Left lower leg: No edema  Skin:     General: Skin is warm and dry  Neurological:      Mental Status: She is alert and oriented to person, place, and time  Mental status is at baseline  Discussion with Family: no    Discharge instructions/Information to patient and family:   See after visit summary for information provided to patient and family  Provisions for Follow-Up Care:  See after visit summary for information related to follow-up care and any pertinent home health orders  Planned Readmission: no     Discharge Statement:  I spent 32 minutes discharging the patient  This time was spent on the day of discharge  I had direct contact with the patient on the day of discharge  Greater than 50% of the total time was spent examining patient, answering all patient questions, arranging and discussing plan of care with patient as well as directly providing post-discharge instructions  Additional time then spent on discharge activities  Discharge Medications:  See after visit summary for reconciled discharge medications provided to patient and family        ** Please Note: This note has been constructed using a voice recognition system **

## 2021-04-06 NOTE — PLAN OF CARE
Problem: PHYSICAL THERAPY ADULT  Goal: Performs mobility at highest level of function for planned discharge setting  See evaluation for individualized goals  Description: Treatment/Interventions: Functional transfer training, LE strengthening/ROM, Elevations, Therapeutic exercise, Endurance training, Bed mobility, Gait training, Spoke to nursing, Spoke to case management, OT  Equipment Recommended: Other (Comment)(tbd)       See flowsheet documentation for full assessment, interventions and recommendations  Note: Prognosis: Good  Problem List: Decreased strength, Decreased endurance, Impaired balance, Decreased mobility, Pain  Assessment: Pt is a 80 y o  female seen for PT evaluation s/p admit to [facility] on 4/4/2021  Pt was admitted with a primary dx of: complete heart block  Pt is POD#1 s/p pacemaker  PT now consulted for assessment of mobility and d/c needs  Pt with Up with assistance, active PT evaluation orders  Pts current comorbidities effecting treatment include: HTN, EVANGELINA, rheumatoid arthritis, PAD  Pts current clinical presentation is Unstable/ Unpredictable (high complexity) due to Ongoing medical management for primary dx, Increased reliance on more restrictive AD compared to baseline, Decreased activity tolerance compared to baseline, Fall risk, Increased assistance needed from caregiver at current time, Ongoing telemetry monitoring, s/p surgical intervention  Prior to admission, pt was I with ADLs and ambulating w/o AD  Pt lives with her son in a [address] with ramped entrance; has a FFSU  Upon evaluation, pt currently is requiring Arnel for transfers and Arnel for ambulation 120 ft w/ HHA  Pt presents at PT eval functioning below baseline and currently w/ overall mobility deficits 2* to: BLE weakness, impaired balance, decreased endurance, gait deviations, pain, decreased activity tolerance compared to baseline, decreased functional mobility tolerance compared to baseline, fall risk   Pt currently at a fall risk 2* to impairments listed above  Pt will continue to benefit from skilled acute PT interventions to address stated impairments; to maximize functional mobility; for ongoing pt/ family training; and DME needs  At conclusion of PT session pt returned back in chair with phone and call bell within reach  Pt denies any further questions at this time  Please also refer to the recommendation of the Physical Therapist for safe discharge planning  Recommend home with social support upon hospital D/C  PT Discharge Recommendation: Return to previous environment with social support     PT - OK to Discharge: Yes(when medically cleared)    See flowsheet documentation for full assessment

## 2021-04-06 NOTE — DISCHARGE INSTRUCTIONS
PCP should check BMP 1-2 weeks    Please refer to post pacemaker implantation discharge instructions and restrictions and your pacemaker booklet/temporary card  Keep incision dry for one week  Do not use lotions/powders/creams on incision  Leave outer bandage in place for 1 week - it is water proof, and as long as it is fully adhered to your skin you may shower with it  If it appears as though the bandage is coming off and/or there is any communication to the area of device incision, please then keep the whole area dry for the remaining week  After 1 week, please remove by pulling all edges away from the center of the bandage  No overhead reaching/pushing/pulling/lifting greater than 5-10lbs with left arm for six weeks  Please call the office if you notice redness, swelling, bleeding, or drainage from incision or if you develop fevers  AFTER PACEMAKER CARE:    If you have any questions, please call [phone] to speak with a nurse (8:30am-4pm, or [phone] after hours)  For appointments, please call [phone]  WHAT YOU SHOULD KNOW:   A pacemaker is a small, battery-powered device that is placed under your skin in your upper chest area with wires placed through a vein that lead directly into the heart  It helps regulate your heart rate and prevent your heart from beating too slowly  AFTER YOU LEAVE:     Medicines:     · Pain medicine: You may need medicine to take away or decrease pain  ¨ Learn how to take your medicine  Ask what medicine and how much you should take  Be sure you know how, when, and how often to take it  Usually Over the counter pain medicine is sufficient to control pain (Acetominophen or Ibuprofen) Ask your doctor if you may take these  If this does not control your pain, narcotic pain killers may be prescribed, please call if you need prescription  ¨ Do not wait until the pain is severe before you take your medicine   Tell caregivers if your pain does not decrease  ¨ Pain medicine can make you dizzy or sleepy  Prevent falls by calling someone when you get out of bed or if you need help  Take your medicine as directed  Call your healthcare provider if you think your medicine is not helping or if you have side effects  Tell him if you are allergic to any medicine  Follow up with your cardiologist after your procedure: You will need a follow-up visit approximately 2 weeks after you leave the hospital  Your cardiologist will check your wound and make sure that your pacemaker is working correctly  Follow the instructions to check your pacemaker: Your cardiologist or primary healthcare provider will check your pacemaker and the battery regularly  He will use a computer to check your pacemaker over the telephone or wireless device which will be given to you  Pacemaker batteries usually last 8 to 10 years  The pacemaker unit will be replaced when the battery gets low  This is a simpler procedure than the original one to implant your pacemaker  Wound care:  Keep your incision dry for one week  Do not use lotions/powders/creams on incision  Leave outer bandage in place for 1 week - it is water proof, and as long as it is fully adhered to your skin you may shower with it  If it appears as though the bandage is coming off and/or there is any communication to the area of device incision, please then keep the whole area dry for the remaining week  After 1 week, please remove by pulling all edges away from the center of the bandage  Please call the office if you notice redness, swelling, bleeding, or drainage from incision or if you develop fevers  Activity:   · Arm movement and lifting:  Be careful using the arm on the side of your pacemaker  Do not move your arm for the first 24 hours after your procedure  Do not  lift your arm above your shoulder or lift more than 10 pounds for one month after your procedure   Avoid pushing, pulling, or repetitive arm movements for one month  This helps the leads stay in place and helps your wound heal  Ask your caregiver when you can drive after your procedure  You may move your arm side to side without lifting above your shoulder, and do not need to wear a sling at home  · Driving: you are ok to drive 48 hours after pacemaker is implanted   · Sports:  Ask your caregiver when it is okay to play tennis, golf, basketball, or any sport that requires you to lift your arms  Do not play full contact sports, such as football, that could damage your pacemaker  Ask your cardiologist or primary healthcare provider how much and what kinds of physical activity are safe for you  Living with a pacemaker:   · Tell all caregivers you have a pacemaker: This includes surgeons, radiologists, and medical technicians  You may want to wear a medical alert ID bracelet or necklace that states that you have a pacemaker  · Carry your pacemaker ID card: Make sure you receive a pacemaker ID card  Carry it with you at all times  It lists important information about your pacemaker  Show it to airport security if you travel  · Avoid electrical interference:  Avoid welding equipment and other equipment with large magnets or electric fields  These things could interfere with how your pacemaker works  Use your cell phone on the ear opposite from your pacemaker  Do not carry your cell phone in your shirt pocket over your chest      · Some Pacemakers are MRI safe  Ask you doctor if it is safe to proceed with MRI and let the radiologist and staff know you have a pacemaker  · Do not touch the skin around your pacemaker: This can cause damage to the lead wires or move the pacemaker unit from where it should be  Contact your cardiologist or primary healthcare provider if:   · The area around your pacemaker has increasing amount of pain after surgery   The pain should improve over first few days after implantation  · The skin around your stitches has increasing redness, swelling, or has drainage  This may mean that you have an infection  · You have a fever  · You have chills, a cough, and feel weak or achy  These are also signs of infection  · Your feet or ankles are more swollen than your baseline  · Your Heart rate is less than 50 beats per minute     Seek care immediately if:   · Your bandage becomes soaked with blood  · Your pacemaker is swelling rapidly    · Your stitches open up  · You feel your heart suddenly beating very slowly or quickly  · You become too weak or dizzy to stand, or you pass out  · Your arm or leg feels warm, tender, and painful  It may look swollen and red  · You have chest pain that does not go away with rest or medicine  · You feel lightheaded, short of breath, and have chest pain  · You cough up blood  © 2014 [address] is for End User's use only and may not be sold, redistributed or otherwise used for commercial purposes  All illustrations and images included in CareNotes® are the copyrighted property of A D A M , Inc  or Kal Aguilar  The above information is an  only  It is not intended as medical advice for individual conditions or treatments  Talk to your doctor, nurse or pharmacist before following any medical regimen to see if it is safe and effective for you

## 2021-04-06 NOTE — PHYSICAL THERAPY NOTE
Physical Therapy Evaluation    Patient's Name: Bonnetta Goon A Fahr    Admitting Diagnosis  Complete heart block ([facility] [address] ) [I44 2]    Problem List  Patient Active Problem List   Diagnosis    Rotator cuff tendinitis, right    Complete heart block (HCC)    EVANGELINA (acute kidney injury) ([facility] [address] )    Benign essential HTN    Rheumatoid arthritis ([facility] [address] )    S/P placement of cardiac pacemaker       Past Medical History  History reviewed  No pertinent past medical history  Past Surgical History  Past Surgical History:   Procedure Laterality Date    BREAST BIOPSY Left 02/18/2010    calcs    HYSTERECTOMY      age 40    OOPHORECTOMY Right     age 40        04/06/21 0934   PT Last Visit   PT Visit Date 04/06/21   Note Type   Note type Evaluation   Pain Assessment   Pain Assessment Tool Pain Assessment not indicated - pt denies pain   Home Living   Type of [address] [place] [address] Two level;Stairs to enter with rails; Ramped entrance   [address] [facility]   Additional Comments Pt reports living in a [address] with ramped entrance  Has a FFSU  Was not using any DME PTA  Prior Function   Level of [place] Independent with ADLs and functional mobility   Lives With Son   Receives Help From Family   ADL Assistance Independent   IADLs Independent   Falls in the last 6 months 0   Vocational Retired   Comments Pt reports that her son can assist as needed  Her daughter will also be coming to stay with her upon d/c  Restrictions/Precautions   Weight Bearing Precautions Per Order No   Braces or Orthoses Sling  (RUE )   Other Precautions Cardiac/sternal;Multiple lines;Telemetry; Fall Risk  (s/p PPM)   Cognition   Overall Cognitive Status WFL   Attention Within functional limits   Orientation Level Oriented X4   Memory Decreased recall of precautions   Following Commands Follows one step commands without difficulty   Comments pt pleasant and cooperative   RLE Assessment   RLE Assessment WFL  (grossly at least 3+/5)   LLE Assessment   LLE Assessment WFL  (grossly at least 3+/5)   Bed Mobility   Additional Comments OOB upon arrival, not assessed   Transfers   Sit to Stand 4  Minimal assistance   Additional items Assist x 1; Increased time required;Verbal cues   Stand to Sit 4  Minimal assistance   Additional items Assist x 1; Increased time required;Verbal cues   Additional Comments Transfers w/o AD  VCs to avoid weight through RUE  Ambulation/Elevation   Gait pattern Excessively slow; Inconsistent adeline; Short stride   Gait Assistance 4  Minimal assist   Additional items Assist x 1;Verbal cues   Assistive Device Other (Comment)  (HHA)   Distance 120ft   Balance   Static Sitting Fair +   Dynamic Sitting Fair   Static Standing Fair -   Dynamic Standing Fair -   Ambulatory Poor +   Activity Tolerance   Activity Tolerance Patient tolerated treatment well   Medical Staff Made Aware OT Dewey Brock ok to see per RN   Assessment   Prognosis Good   Problem List Decreased strength;Decreased endurance; Impaired balance;Decreased mobility;Pain   Assessment Pt is a 80 y o  female seen for PT evaluation s/p admit to [facility] on 4/4/2021  Pt was admitted with a primary dx of: complete heart block  Pt is POD#1 s/p pacemaker  PT now consulted for assessment of mobility and d/c needs  Pt with Up with assistance, active PT evaluation orders  Pts current comorbidities effecting treatment include: HTN, EVANGELINA, rheumatoid arthritis, PAD  Pts current clinical presentation is Unstable/ Unpredictable (high complexity) due to Ongoing medical management for primary dx, Increased reliance on more restrictive AD compared to baseline, Decreased activity tolerance compared to baseline, Fall risk, Increased assistance needed from caregiver at current time, Ongoing telemetry monitoring, s/p surgical intervention  Prior to admission, pt was I with ADLs and ambulating w/o AD  Pt lives with her son in a [facility] with ramped entrance; has a FFSU   Upon evaluation, pt currently is requiring Arnel for transfers and Arnel for ambulation 120 ft w/ HHA  Pt presents at PT eval functioning below baseline and currently w/ overall mobility deficits 2* to: BLE weakness, impaired balance, decreased endurance, gait deviations, pain, decreased activity tolerance compared to baseline, decreased functional mobility tolerance compared to baseline, fall risk  Pt currently at a fall risk 2* to impairments listed above  Pt will continue to benefit from skilled acute PT interventions to address stated impairments; to maximize functional mobility; for ongoing pt/ family training; and DME needs  At conclusion of PT session pt returned back in chair with phone and call bell within reach  Pt denies any further questions at this time  Please also refer to the recommendation of the Physical Therapist for safe discharge planning  Recommend home with social support upon hospital D/C  Goals   Patient Goals to go home   STG Expiration Date 04/16/21   Short Term Goal #1 In 10 days pt will be able to: 1  Demonstrate ability to perform all aspects of bed mobility independently to increase functional independence  2  Perform functional transfers with LRAD at mod I level to facilitate safe return to previous living environment  3   Ambulate 300 ft with LRAD at mod I level with stable vitals to improve safety with household distances and reduce fall risk  4  Improve LE strength grades by 1 to increase ease of functional mobility with transfers and gait  5  Pt will demonstrate improved balance by one grade order to decrease risk of falls  PT Treatment Day 0   Plan   Treatment/Interventions Functional transfer training;LE strengthening/ROM; Elevations; Therapeutic exercise; Endurance training;Bed mobility;Gait training;Spoke to nursing;Spoke to case management;OT   PT Frequency Other (Comment)  (3-5x/wk)   Recommendation   PT Discharge Recommendation Return to previous environment with social support   Equipment Recommended Other (Comment)  (tbd)   PT - OK to Discharge Yes  (when medically cleared)   Additional Comments Pt would benefit from followup session to assess for potential need for AD      AM-PAC Basic Mobility Inpatient   Turning in Bed Without Bedrails 4   Lying on Back to Sitting on Edge of Flat Bed 4   Moving Bed to Chair 3   Standing Up From Chair 3   Walk in Room 3   Climb 3-5 Stairs 3   Basic Mobility Inpatient Raw Score 20   Basic Mobility Standardized Score 43 99   Modified [place] Scale   Modified [place] Scale 4       Ml Catalan, PT, DPT

## 2021-04-06 NOTE — OCCUPATIONAL THERAPY NOTE
Occupational Therapy Evaluation     Patient Name: Artur Ramirez  RSVFG'E Date: 4/6/2021  Problem List  Principal Problem:    Complete heart block ([facility] [address] )  Active Problems:    EVANGELINA (acute kidney injury) ([facility] [address] )    Benign essential HTN    Rheumatoid arthritis (HCC)    S/P placement of cardiac pacemaker    Past Medical History  History reviewed  No pertinent past medical history  Past Surgical History  Past Surgical History:   Procedure Laterality Date    BREAST BIOPSY Left 02/18/2010    calcs    HYSTERECTOMY      age 40    OOPHORECTOMY Right     age 40           04/06/21 0931   OT Last Visit   OT Visit Date 04/06/21   Note Type   Note type Evaluation   Restrictions/Precautions   Braces or Orthoses Sling  (R UE)   Other Precautions Cardiac/sternal;Multiple lines;Telemetry  (PPM precautions)   Pain Assessment   Pain Assessment Tool 0-10   Pain Score No Pain   Home Living   Type of Home House   Home Layout Two level; Able to live on main level with bedroom/bathroom; Ramped entrance   Bathroom Shower/Tub Tub/shower unit   Bathroom Toilet Standard   Bathroom Equipment   (denies)   Bathroom Accessibility Accessible   Additional Comments Pt reports living in a 2 story home with a ramp to enter and first floor setup  Prior Function   Level of [place] Independent with ADLs and functional mobility   Lives With Son   Receives Help From Family   ADL Assistance Independent   IADLs Independent   Falls in the last 6 months 0   Vocational Retired   Lifestyle   Autonomy Pt reports being I with ADLS, IADLS and mobility without device PTA  (+)     Reciprocal Relationships Pt lives with her son who she reports is able to assist as needed  Pt reports her supportive daughter is also coming to stay with her upon d/c     Service to Others Retired    [address] Enjoys crocheting   Subjective   Subjective Pt reports that she has no concerns about returning home and that she has very supportive family      ADL   Where Assessed Chair   Eating Assistance 5  Supervision/Setup   Grooming Assistance 5  Supervision/Setup   UB Bathing Assistance 4  Minimal Assistance   LB Bathing Assistance 4  Minimal Assistance   UB Dressing Assistance 4  Minimal Assistance   LB Dressing Assistance 4  [address]  4  Minimal Assistance   Transfers   Sit to Stand 4  Minimal assistance   Additional items Assist x 1; Increased time required;Verbal cues   Stand to Sit 4  Minimal assistance   Additional items Assist x 1; Increased time required;Verbal cues   Functional Mobility   Functional Mobility 4  Minimal assistance   Additional Comments Pt demonstrated household mobility with no rest breaks  Additional items Hand hold assistance   Balance   Static Sitting Fair +   Dynamic Sitting Fair   Static Standing Fair -   Dynamic Standing Fair -   Ambulatory Poor +   Activity Tolerance   Activity Tolerance Patient tolerated treatment well   Medical Staff Made Aware PT Anita   Nurse Made Aware RN confirmed okay to see pt   RUE Assessment   RUE Assessment X  (In sling s/p PPM)   LUE Assessment   LUE Assessment WFL   Cognition   Overall Cognitive Status WFL   Arousal/Participation Alert; Cooperative   Attention Within functional limits   Orientation Level Oriented X4   Memory Decreased recall of precautions   Following Commands Follows one step commands without difficulty   Comments Pt requires some VC for correct technique, but is very pleasant and cooperative  Assessment   Assessment Pt is a 80 y o  female admitted to B on 4/4/2021 w/ complete heart block s/p PPM on 4/5/2021  Comorbidities include a h/o rheumatoid arthritis, benign essential HTN, and rotator cuff tendinitis  Pt with active OT orders and up with assistance  orders  Pt resides in a 2 story home with a ramp to enter and a [facility]  Pt lives with very supportive family who are able to assist as needed upon d/c   Pt was I w/  ADLS and IADLS, (+) drove, & required no use of DME PTA  Currently pt is min A for functional transfers and functional mobility, and min A for ADLS overall  Based on the aforementioned OT evaluation, functional performance deficits, and assessments, pt has been identified as a moderate complexity evaluation  From OT standpoint, anticipate d/c home with family support  Recommend continued participation in [facility] and functional mobility with staff  No further acute OT needs, d/c OT      Goals   Patient Goals To return home   Recommendation   OT Discharge Recommendation Return to previous environment with social support   OT - OK to Discharge Yes  (When medically appropriate)   AM-PAC Daily Activity Inpatient   Lower Body Dressing 3   Bathing 3   Toileting 3   Upper Body Dressing 3   Grooming 4   Eating 4   Daily Activity Raw Score 20   Daily Activity Standardized Score (Calc for Raw Score >=11) 42 03   AM-PAC Applied Cognition Inpatient   Following a Speech/Presentation 3   Understanding Ordinary Conversation 4   Taking Medications 4   Remembering Where Things Are Placed or Put Away 4   Remembering List of 4-5 Errands 3   Taking Care of Complicated Tasks 3   Applied Cognition Raw Score 21   Applied Cognition Standardized Score 44 3   Modified [place] Scale   Modified [place] Scale 4       Yennifer Neal, ANTOINE, OTR/L

## 2021-04-09 ENCOUNTER — PATIENT OUTREACH (OUTPATIENT)
Dept: CASE MANAGEMENT | Facility: OTHER | Age: 86
End: 2021-04-09

## 2021-04-09 NOTE — PROGRESS NOTES
In basket notification received of hospital discharge  Chart reviewed  I spoke with patient who is at home  She reports her daughter came from [place] to stay with her during her recovery  She has some discomfort in her shoulder above the pacemaker site which is relieved by taking Roxicodone  I advised her of the common side effects of an opioid  She states I only take it when necessary  She is resting and knows not to lift or over use her arm  She has scheduled a follow up with her cardiologist Dr Harleen Healy on Monday 4/12  I gave her my contact information which she repeated back to me

## 2021-04-12 ENCOUNTER — EPISODE CHANGES (OUTPATIENT)
Dept: CASE MANAGEMENT | Facility: HOSPITAL | Age: 86
End: 2021-04-12

## 2022-01-03 ENCOUNTER — NURSING HOME VISIT (OUTPATIENT)
Dept: FAMILY MEDICINE CLINIC | Facility: CLINIC | Age: 87
End: 2022-01-03
Payer: MEDICARE

## 2022-01-03 DIAGNOSIS — I44.2 COMPLETE HEART BLOCK (HCC): ICD-10-CM

## 2022-01-03 DIAGNOSIS — I10 BENIGN ESSENTIAL HTN: Primary | ICD-10-CM

## 2022-01-03 DIAGNOSIS — R26.9 NEUROLOGIC GAIT DYSFUNCTION: ICD-10-CM

## 2022-01-03 DIAGNOSIS — M06.9 RHEUMATOID ARTHRITIS, INVOLVING UNSPECIFIED SITE, UNSPECIFIED WHETHER RHEUMATOID FACTOR PRESENT (HCC): ICD-10-CM

## 2022-01-03 DIAGNOSIS — U07.1 COVID-19: ICD-10-CM

## 2022-01-03 DIAGNOSIS — E78.2 MIXED HYPERLIPIDEMIA: ICD-10-CM

## 2022-01-03 PROCEDURE — 99306 1ST NF CARE HIGH MDM 50: CPT | Performed by: FAMILY MEDICINE

## 2022-01-03 NOTE — PROGRESS NOTES
Assessment/Plan:         Problem List Items Addressed This Visit        Cardiovascular and Mediastinum    Complete heart block ([facility] [address] )     Pacer in place         Benign essential HTN - Primary     Patient is stable with current anti-hypertensive medicine and continue to follow a low sodium diet and take current medication  All questions about this condition were answered today  Musculoskeletal and Integument    Rheumatoid arthritis ([facility] [address] )     Patient is stable  and will continue present plan of care and reassess at next routine visit  All questions about this problem from patient were answered today  Other    Neurologic gait dysfunction     Will start PT/OT         COVID-19     Improved from hospital stay and now for rehabilitation to she can go back home with her son again  Mixed hyperlipidemia     Patient  is stable with current medication and we discussed a low fat low cholesterol diet  Weight loss also discussed for this will help lower cholesterol also  Recheck lipids in 6 months  Subjective:      Patient ID: Justin Santiago is a 80 y o  female  This is an 80-year-old white female seen examined for history and physical to the [facility]  Patient was recently in the hospital for Adiel had a prolonged stay but she did well  Patient is now here for rehab to get strong enough so she can go back home to live with her son again  Patient has a past history of complete heart block with it a pacemaker  Patient also hyperlipidemia hypertension history of rheumatoid arthritis  Her medicines spent have been reviewed and I have ordered her continuing medicines from the hospital   Patient is free of chest pain appetite is good and she is looking forward to getting some therapy so she can get stronger to go back home        The following portions of the patient's history were reviewed and updated as appropriate:   Past Medical History:  She has no past medical history on file ,  _______________________________________________________________________  Medical Problems:  does not have any pertinent problems on file ,  _______________________________________________________________________  Past Surgical History:   has a past surgical history that includes Hysterectomy; Oophorectomy (Right); and Breast biopsy (Left, 02/18/2010)  ,  _______________________________________________________________________  Family History:  family history includes No Known Problems in her daughter, father, maternal grandfather, maternal grandmother, mother, paternal grandfather, paternal grandmother, sister, sister, son, son, and son ,  _______________________________________________________________________  Social History:   reports that she has never smoked  She has never used smokeless tobacco  She reports that she does not drink alcohol and does not use drugs  ,  _______________________________________________________________________  Allergies:  is allergic to abatacept, acyclovir, celecoxib, denosumab, hydroxychloroquine, nabumetone, pantoprazole, and sunscreens     _______________________________________________________________________  Current Outpatient Medications   Medication Sig Dispense Refill    atorvastatin (LIPITOR) 20 mg tablet       Calcium-Vitamin D-Vitamin K 500-1000-40 MG-UNT-MCG CHEW       Multiple Vitamin (multivitamin) tablet Take 1 tablet by mouth daily      PREDNISONE PO Take 2 5 mg by mouth 2 (two) times a day with meals        No current facility-administered medications for this visit      _______________________________________________________________________  Review of Systems   Constitutional: Negative for activity change, appetite change, fatigue and fever  HENT: Negative for congestion, ear pain, postnasal drip, rhinorrhea, sinus pressure, sinus pain, sneezing and sore throat  Eyes: Negative for pain and redness     Respiratory: Negative for apnea, cough, chest tightness, shortness of breath and wheezing  Cardiovascular: Negative for chest pain, palpitations and leg swelling  Gastrointestinal: Negative for abdominal pain, constipation, diarrhea, nausea and vomiting  Endocrine: Negative for cold intolerance and heat intolerance  Genitourinary: Negative for difficulty urinating, dysuria, frequency, hematuria and urgency  Musculoskeletal: Positive for gait problem  Negative for arthralgias, back pain and myalgias  Skin: Negative for rash  Neurological: Positive for weakness  Negative for dizziness, speech difficulty, numbness and headaches  Hematological: Does not bruise/bleed easily  Psychiatric/Behavioral: Negative for agitation, confusion and hallucinations  Objective: There were no vitals filed for this visit  There is no height or weight on file to calculate BMI  Physical Exam  Vitals and nursing note reviewed  Constitutional:       Appearance: She is well-developed  HENT:      Head: Normocephalic and atraumatic  Nose: Nose normal       Mouth/Throat:      Mouth: Mucous membranes are moist    Eyes:      General: No scleral icterus  Conjunctiva/sclera: Conjunctivae normal       Pupils: Pupils are equal, round, and reactive to light  Neck:      Thyroid: No thyromegaly  Cardiovascular:      Rate and Rhythm: Normal rate and regular rhythm  Pulmonary:      Effort: Pulmonary effort is normal       Breath sounds: Normal breath sounds  No wheezing  Comments: Nasal O2 in place  Abdominal:      General: Bowel sounds are normal  There is no distension  Palpations: Abdomen is soft  Tenderness: There is no abdominal tenderness  There is no guarding or rebound  Musculoskeletal:         General: No tenderness or deformity  Normal range of motion  Cervical back: Normal range of motion and neck supple  Right lower leg: No edema  Left lower leg: No edema  Skin:     General: Skin is warm and dry  Findings: No erythema or rash  Comments: Eschar on lips from hospital stay that is healing  Neurological:      Mental Status: She is alert and oriented to person, place, and time  Sensory: No sensory deficit  Psychiatric:         Mood and Affect: Mood normal          Behavior: Behavior normal          Thought Content:  Thought content normal          Judgment: Judgment normal

## 2024-02-20 ENCOUNTER — HOSPITAL ENCOUNTER (EMERGENCY)
Facility: HOSPITAL | Age: 89
Discharge: HOME | End: 2024-02-20
Attending: EMERGENCY MEDICINE | Admitting: EMERGENCY MEDICINE
Payer: MEDICARE

## 2024-02-20 ENCOUNTER — APPOINTMENT (EMERGENCY)
Dept: RADIOLOGY | Facility: HOSPITAL | Age: 89
End: 2024-02-20
Payer: MEDICARE

## 2024-02-20 VITALS
TEMPERATURE: 97.7 F | RESPIRATION RATE: 18 BRPM | SYSTOLIC BLOOD PRESSURE: 201 MMHG | HEART RATE: 60 BPM | DIASTOLIC BLOOD PRESSURE: 84 MMHG | OXYGEN SATURATION: 97 %

## 2024-02-20 DIAGNOSIS — L03.113 CELLULITIS OF HAND, RIGHT: Primary | ICD-10-CM

## 2024-02-20 DIAGNOSIS — I10 HTN (HYPERTENSION): ICD-10-CM

## 2024-02-20 DIAGNOSIS — M79.89 SWELLING OF RIGHT HAND: ICD-10-CM

## 2024-02-20 LAB
ALBUMIN SERPL BCP-MCNC: 4.2 G/DL (ref 3.5–5)
ALP SERPL-CCNC: 50 U/L (ref 34–104)
ALT SERPL W P-5'-P-CCNC: 14 U/L (ref 7–52)
ANION GAP SERPL CALCULATED.3IONS-SCNC: 9 MMOL/L
AST SERPL W P-5'-P-CCNC: 18 U/L (ref 13–39)
BASOPHILS # BLD AUTO: 0.02 THOUSANDS/ÂΜL (ref 0–0.1)
BASOPHILS NFR BLD AUTO: 0 % (ref 0–1)
BILIRUB SERPL-MCNC: 0.48 MG/DL (ref 0.2–1)
BUN SERPL-MCNC: 23 MG/DL (ref 5–25)
CALCIUM SERPL-MCNC: 10.1 MG/DL (ref 8.4–10.2)
CHLORIDE SERPL-SCNC: 101 MMOL/L (ref 96–108)
CO2 SERPL-SCNC: 29 MMOL/L (ref 21–32)
CREAT SERPL-MCNC: 0.95 MG/DL (ref 0.6–1.3)
CRP SERPL QL: 17.1 MG/L
EOSINOPHIL # BLD AUTO: 0.1 THOUSAND/ÂΜL (ref 0–0.61)
EOSINOPHIL NFR BLD AUTO: 1 % (ref 0–6)
ERYTHROCYTE [DISTWIDTH] IN BLOOD BY AUTOMATED COUNT: 12.5 % (ref 11.6–15.1)
GFR SERPL CREATININE-BSD FRML MDRD: 52 ML/MIN/1.73SQ M
GLUCOSE SERPL-MCNC: 107 MG/DL (ref 65–140)
HCT VFR BLD AUTO: 45.3 % (ref 34.8–46.1)
HGB BLD-MCNC: 14.1 G/DL (ref 11.5–15.4)
IMM GRANULOCYTES # BLD AUTO: 0.04 THOUSAND/UL (ref 0–0.2)
IMM GRANULOCYTES NFR BLD AUTO: 0 % (ref 0–2)
LYMPHOCYTES # BLD AUTO: 1.67 THOUSANDS/ÂΜL (ref 0.6–4.47)
LYMPHOCYTES NFR BLD AUTO: 18 % (ref 14–44)
MCH RBC QN AUTO: 31.5 PG (ref 26.8–34.3)
MCHC RBC AUTO-ENTMCNC: 31.1 G/DL (ref 31.4–37.4)
MCV RBC AUTO: 101 FL (ref 82–98)
MONOCYTES # BLD AUTO: 0.8 THOUSAND/ÂΜL (ref 0.17–1.22)
MONOCYTES NFR BLD AUTO: 9 % (ref 4–12)
NEUTROPHILS # BLD AUTO: 6.78 THOUSANDS/ÂΜL (ref 1.85–7.62)
NEUTS SEG NFR BLD AUTO: 72 % (ref 43–75)
NRBC BLD AUTO-RTO: 0 /100 WBCS
PLATELET # BLD AUTO: 130 THOUSANDS/UL (ref 149–390)
PMV BLD AUTO: 10.6 FL (ref 8.9–12.7)
POTASSIUM SERPL-SCNC: 4.2 MMOL/L (ref 3.5–5.3)
PROT SERPL-MCNC: 7.3 G/DL (ref 6.4–8.4)
RBC # BLD AUTO: 4.48 MILLION/UL (ref 3.81–5.12)
SODIUM SERPL-SCNC: 139 MMOL/L (ref 135–147)
WBC # BLD AUTO: 9.41 THOUSAND/UL (ref 4.31–10.16)

## 2024-02-20 PROCEDURE — 80053 COMPREHEN METABOLIC PANEL: CPT | Performed by: EMERGENCY MEDICINE

## 2024-02-20 PROCEDURE — 99283 EMERGENCY DEPT VISIT LOW MDM: CPT

## 2024-02-20 PROCEDURE — 85025 COMPLETE CBC W/AUTO DIFF WBC: CPT | Performed by: EMERGENCY MEDICINE

## 2024-02-20 PROCEDURE — 73130 X-RAY EXAM OF HAND: CPT

## 2024-02-20 PROCEDURE — 99285 EMERGENCY DEPT VISIT HI MDM: CPT | Performed by: EMERGENCY MEDICINE

## 2024-02-20 PROCEDURE — 86140 C-REACTIVE PROTEIN: CPT | Performed by: EMERGENCY MEDICINE

## 2024-02-20 PROCEDURE — 36415 COLL VENOUS BLD VENIPUNCTURE: CPT | Performed by: EMERGENCY MEDICINE

## 2024-02-20 RX ORDER — ACETAMINOPHEN 325 MG/1
975 TABLET ORAL ONCE
Status: COMPLETED | OUTPATIENT
Start: 2024-02-20 | End: 2024-02-20

## 2024-02-20 RX ORDER — CEPHALEXIN 500 MG/1
500 CAPSULE ORAL EVERY 6 HOURS SCHEDULED
Qty: 28 CAPSULE | Refills: 0 | Status: SHIPPED | OUTPATIENT
Start: 2024-02-20 | End: 2024-02-27

## 2024-02-20 RX ADMIN — ACETAMINOPHEN 975 MG: 325 TABLET ORAL at 13:29

## 2024-02-20 NOTE — DISCHARGE INSTRUCTIONS
Return sooner to the Emergency Department if increased pain, swelling, numbness, weakness, fever, redness, vomiting.    Please utilize the provided medications to help with symptom control at home.  You have been provided with an antibiotic to help with treatment of localized swelling as well as inflammation of the area.  Please utilize over-the-counter medications such as Tylenol to help with pain control.    Please utilize the provided contact information to establish care with our orthopedic hand surgery team.  Please also follow-up with your primary care provider for continued evaluation and monitoring of your symptoms.

## 2024-02-20 NOTE — ED ATTENDING ATTESTATION
2/20/2024  I, Naye Mooney MD, saw and evaluated the patient. I have discussed the patient with the resident/non-physician practitioner and agree with the resident's/non-physician practitioner's findings, Plan of Care, and MDM as documented in the resident's/non-physician practitioner's note, except where noted. All available labs and Radiology studies were reviewed.  I was present for key portions of any procedure(s) performed by the resident/non-physician practitioner and I was immediately available to provide assistance.       At this point I agree with the current assessment done in the Emergency Department.  I have conducted an independent evaluation of this patient a history and physical is as follows:      89 yo female with h/o HTN presents with atraumatic pain/swelling to right hand in thenar eminence and dorsum.  Denies obvious trauma/inciting event.  Denies systemic complaint including fever/chills/malaise/fatigue/chest pain or SOB.  Pain worse with palpation and ROM.  On exam pt with erythematous soft tissue in right thenar eminence and dorsum, no fluctuance/crepitus/pain out of proportion.  Pt does report TTP.  No ROM limitations with either active/passive ROM.  Benign wrist/elbow.  Pulses intact.  Xray without obvious fracture or FB.  No gas.  OA noted.  Labs largely reassuring save for elevated CRP.  Case was discussed in real time with hand who ok'd discharge and f/u as outpatient.  Area outlined with skin marker.  Abx initiated.  Pt instructed to RTER with progressive symptoms or any new concerns.  Possible cellulitis.  Doubt abscess.  Exam not c/w septic joint.        ED Course  ED Course as of 02/20/24 1341   Tue Feb 20, 2024   1246 CBC and differential(!)  No significant leukocytosis reassuring diff, normal H/H, slightly low platelets. Last plts 2 years prior 160 per chart     1323 Comprehensive metabolic panel  Reassuring, no end organ damage, no AG, normal bicarb.       1323 C-reactive  protein(!)  elevated         Critical Care Time  Procedures

## 2024-02-20 NOTE — ED PROVIDER NOTES
History  Chief Complaint   Patient presents with    Hand Swelling     R hand swelling & redness since Sunday. No injury.      Kelli is a 89-year-old female presents to the emergency department with increased redness and erythema over the dorsum of the right hand that has been uncomfortable with no areas of drainage or discharge noted.  Symptoms began this past Sunday (2/18).  She states that she is not aware of any puncture wounds, scratches, or abrasions to the hand.  She denies any numbness or paresthesias.  She states that she is able to fully close her hand as well as extend up to baseline level of range of motion that she feels is in association with her previous diagnoses of rheumatoid and osteoarthritis.    She denies any fevers or chills.  She denies any chest pain or shortness of breath.  Patient has been able to eat and drink at baseline.  She denies any headaches, changes in vision, changes in hearing.  She states that she does have a history of high blood pressure and is denying any other symptomology today aside from the pain in her hand over the areas of where she is experiencing redness and skin changes.      History provided by:  Patient   used: No        Prior to Admission Medications   Prescriptions Last Dose Informant Patient Reported? Taking?   Calcium-Vitamin D-Vitamin K 500-1000-40 MG-UNT-MCG CHEW   Yes No   Multiple Vitamin (multivitamin) tablet   Yes No   Sig: Take 1 tablet by mouth daily   PREDNISONE PO   Yes No   Sig: Take 2.5 mg by mouth 2 (two) times a day with meals    atorvastatin (LIPITOR) 20 mg tablet   Yes No      Facility-Administered Medications: None       History reviewed. No pertinent past medical history.    Past Surgical History:   Procedure Laterality Date    BREAST BIOPSY Left 02/18/2010    [facility]    HYSTERECTOMY      age 37    OOPHORECTOMY Right     age 37       Family History   Problem Relation Age of Onset    No Known Problems Mother     No Known  Problems Father     No Known Problems Sister     No Known Problems Daughter     No Known Problems Maternal Grandmother     No Known Problems Maternal Grandfather     No Known Problems Paternal Grandmother     No Known Problems Paternal Grandfather     No Known Problems Sister     No Known Problems Son     No Known Problems Son     No Known Problems Son      I have reviewed and agree with the history as documented.    E-Cigarette/Vaping     E-Cigarette/Vaping Substances     Social History     Tobacco Use    Smoking status: Never    Smokeless tobacco: Never   Substance Use Topics    Alcohol use: Never    Drug use: Never        Review of Systems   Constitutional:  Negative for activity change, appetite change, chills and fever.   HENT:  Negative for ear pain and sore throat.    Eyes:  Negative for pain, discharge and visual disturbance.   Respiratory:  Negative for cough and shortness of breath.    Cardiovascular:  Negative for chest pain and palpitations.   Gastrointestinal:  Negative for abdominal pain and vomiting.   Endocrine: Negative for cold intolerance and heat intolerance.   Genitourinary:  Negative for difficulty urinating, dysuria and hematuria.   Musculoskeletal:  Negative for arthralgias and back pain.   Skin:  Negative for color change and rash.   Neurological:  Negative for dizziness, seizures, syncope, facial asymmetry and headaches.   Hematological:  Negative for adenopathy.   Psychiatric/Behavioral:  Negative for agitation.    All other systems reviewed and are negative.      Physical Exam  ED Triage Vitals   Temperature Pulse Respirations Blood Pressure SpO2   02/20/24 1007 02/20/24 1007 02/20/24 1007 02/20/24 1015 02/20/24 1007   97.7 °F (36.5 °C) 68 19 (!) 233/98 96 %      Temp Source Heart Rate Source Patient Position - Orthostatic VS BP Location FiO2 (%)   02/20/24 1007 02/20/24 1007 02/20/24 1007 02/20/24 1007 --   Oral Monitor Sitting Right arm       Pain Score       02/20/24 1329       8              Orthostatic Vital Signs  Vitals:    02/20/24 1007 02/20/24 1015 02/20/24 1200 02/20/24 1345   BP:  (!) 233/98 (!) 189/80 (!) 201/84   Pulse: 68 60 62 60   Patient Position - Orthostatic VS: Sitting Sitting Sitting Sitting       Physical Exam  Vitals and nursing note reviewed.   Constitutional:       Appearance: Normal appearance. She is well-developed. She is not ill-appearing or diaphoretic.   HENT:      Head: Normocephalic and atraumatic.      Right Ear: External ear normal.      Left Ear: External ear normal.      Nose: Nose normal. No congestion.      Mouth/Throat:      Mouth: Mucous membranes are moist.      Pharynx: No oropharyngeal exudate or posterior oropharyngeal erythema.   Eyes:      General:         Right eye: No discharge.         Left eye: No discharge.      Extraocular Movements: Extraocular movements intact.      Conjunctiva/sclera: Conjunctivae normal.      Pupils: Pupils are equal, round, and reactive to light.   Cardiovascular:      Rate and Rhythm: Normal rate and regular rhythm.      Pulses: Normal pulses.      Heart sounds: No murmur heard.  Pulmonary:      Effort: Pulmonary effort is normal. No respiratory distress.      Breath sounds: Normal breath sounds. No wheezing or rhonchi.   Abdominal:      Palpations: Abdomen is soft.      Tenderness: There is no abdominal tenderness. There is no guarding or rebound.   Musculoskeletal:         General: Swelling present.      Cervical back: Normal range of motion and neck supple. No rigidity.   Skin:     General: Skin is warm and dry.      Capillary Refill: Capillary refill takes less than 2 seconds.      Findings: Erythema present. No bruising or lesion.   Neurological:      General: No focal deficit present.      Mental Status: She is alert and oriented to person, place, and time.      Cranial Nerves: No cranial nerve deficit.      Motor: No weakness.      Coordination: Coordination normal.   Psychiatric:         Mood and Affect: Mood  normal.         ED Medications  Medications   acetaminophen (TYLENOL) tablet 975 mg (975 mg Oral Given 2/20/24 1329)       Diagnostic Studies  Results Reviewed       Procedure Component Value Units Date/Time    Comprehensive metabolic panel [[id number]] Collected: 02/20/24 1140    Lab Status: Final result Specimen: Blood from Arm, Left Updated: 02/20/24 1312     Sodium 139 mmol/L      Potassium 4.2 mmol/L      Chloride 101 mmol/L      CO2 29 mmol/L      ANION GAP 9 mmol/L      BUN 23 mg/dL      Creatinine 0.95 mg/dL      Glucose 107 mg/dL      Calcium 10.1 mg/dL      AST 18 U/L      ALT 14 U/L      Alkaline Phosphatase 50 U/L      Total Protein 7.3 g/dL      Albumin 4.2 g/dL      Total Bilirubin 0.48 mg/dL      eGFR 52 ml/min/1.73sq m     Narrative:      National Kidney Disease Foundation guidelines for Chronic Kidney Disease (CKD):     Stage 1 with normal or high GFR (GFR > 90 mL/min/1.73 square meters)    Stage 2 Mild CKD (GFR = 60-89 mL/min/1.73 square meters)    Stage 3A Moderate CKD (GFR = 45-59 mL/min/1.73 square meters)    Stage 3B Moderate CKD (GFR = 30-44 mL/min/1.73 square meters)    Stage 4 Severe CKD (GFR = 15-29 mL/min/1.73 square meters)    Stage 5 End Stage CKD (GFR <15 mL/min/1.73 square meters)  Note: GFR calculation is accurate only with a steady state creatinine    C-reactive protein [[id number]]  (Abnormal) Collected: 02/20/24 1140    Lab Status: Final result Specimen: Blood from Arm, Left Updated: 02/20/24 1312     CRP 17.1 mg/L     CBC and differential [[id number]]  (Abnormal) Collected: 02/20/24 1140    Lab Status: Final result Specimen: Blood from Arm, Left Updated: 02/20/24 1245     WBC 9.41 Thousand/uL      RBC 4.48 Million/uL      Hemoglobin 14.1 g/dL      Hematocrit 45.3 %       fL      MCH 31.5 pg      MCHC 31.1 g/dL      RDW 12.5 %      MPV 10.6 fL      Platelets 130 Thousands/uL      nRBC 0 /100 WBCs      Neutrophils Relative 72 %      Immat GRANS % 0 %      Lymphocytes Relative 18  %      Monocytes Relative 9 %      Eosinophils Relative 1 %      Basophils Relative 0 %      Neutrophils Absolute 6.78 Thousands/µL      Immature Grans Absolute 0.04 Thousand/uL      Lymphocytes Absolute 1.67 Thousands/µL      Monocytes Absolute 0.80 Thousand/µL      Eosinophils Absolute 0.10 Thousand/µL      Basophils Absolute 0.02 Thousands/µL                    XR hand 3+ views RIGHT   ED Interpretation by Jayme Bazzi MD (02/20 1222)   Chronic osseous pathology appreciated on examination of the hand.  Appears consistent with osteoarthritis and patient's previously reported history of rheumatoid arthritis.  No focal areas of osteomyelitis or focal bone breakdown based of my examination.  Independently read and assessed by Jayme Bazzi.            Procedures  Procedures      ED Course  ED Course as of 02/20/24 1631   Tue Feb 20, 2024   1254 MCV(!): 101   1254 MCHC(!): 31.1   1254 Platelet Count(!): 130                             SBIRT 20yo+      Flowsheet Row Most Recent Value   Initial Alcohol Screen: US AUDIT-C     1. How often do you have a drink containing alcohol? 0 Filed at: 02/20/2024 1059   2. How many drinks containing alcohol do you have on a typical day you are drinking?  0 Filed at: 02/20/2024 1059   3b. FEMALE Any Age, or MALE 65+: How often do you have 4 or more drinks on one occassion? 0 Filed at: 02/20/2024 1059   Audit-C Score 0 Filed at: 02/20/2024 1059   HASMUKH: How many times in the past year have you...    Used an illegal drug or used a prescription medication for non-medical reasons? Never Filed at: 02/20/2024 1059                  Medical Decision Making  Kelli is a 89-year-old female who presents to the emergency department with signs and symptoms that appear most consistent with cellulitic changes of the right hand.    DDX including but not limited to: cellulitis, osteomyelitis, lymphangitis, folliculitis, carbuncle, abscess, rhabdomyolysis, necrotizing fasciitis,  allergic reaction, angioedema, DVT.     Based on initial presenting complaints, x-ray imaging of the hand appears to be consistent with previous chronic bone changes with no acute findings consistent with osteomyelitis at this time.  Laboratory evaluation is grossly unrevealing.  Does not appear to be consistent with necrotizing fasciitis or diffuse soft tissue infection at this time.  No findings consistent with abscess on physical examination.  I have higher suspicion that this is not consistent with cellulitis.  Inflammatory marker (C-reactive protein) was noted to be elevated during examination today.  This could be an acute phase reactant in the setting of localized skin infection given the location as well as the elevated inflammatory marker, this case was discussed with the network on-call orthopedic hand provider (Brian Ordaz) and it was determined based off sharing of imaging studies that they would recommend continued follow-up with antibiotic therapy as well as PCP evaluation.    Skin marker notation on the patient's hand was conducted as well as strict return precautions as well as red flag symptoms that would warrant continued evaluation in the emergency department.  Patient was agreeable with this plan.  Provided with a paper prescription to fill at pharmacy of their choice for antibiotic therapy (Keflex).  I do not feel the patient has risk factors at this time for MRSA infection and will not be covering with concurrent Bactrim at this time.  Encouraged to follow-up with primary care provider.    On documentation of patient's vitals, patient is noted to be hypertensive while in the emergency department.  Patient does have a history of hypertension and follows up with her primary care provider.  Patient denies any headaches, changes in vision, changes in hearing.  No signs of endorgan damage or kidney injury based on the laboratory evaluation in the emergency department.  This was encouraged for  continued follow-up with her PCP with the patient and family at the bedside.  Patient expressed understanding with this plan.  I do not see this blood pressure is a barrier to discharge at this time but impressed upon the patient that it was imperative for them to follow-up with their family care doctor/primary care provider for continued evaluation and monitoring of blood pressure regiment.    Amount and/or Complexity of Data Reviewed  Labs: ordered. Decision-making details documented in ED Course.     Details: Laboratory evaluation grossly unremarkable  Radiology: ordered and independent interpretation performed.     Details: X-ray imaging of the hand shows no findings consistent with acute osseous pathology.  Demonstration of chronic arthritic changes    Risk  OTC drugs.  Prescription drug management.          Disposition  Final diagnoses:   Cellulitis of hand, right   Swelling of right hand   HTN (hypertension)     Time reflects when diagnosis was documented in both MDM as applicable and the Disposition within this note       Time User Action Codes Description Comment    2/20/2024  1:14 PM Jayme Bazzi [L03.113] Cellulitis of hand, right     2/20/2024  1:16 PM Jayme Bazzi [M79.89] Swelling of right hand     2/20/2024  2:35 PM Jayme Bazzi [I10] HTN (hypertension)           ED Disposition       ED Disposition   Discharge    Condition   Stable    Date/Time   Tue Feb 20, 2024 1415    Comment   Vivian A Fahr discharge to home/self care.                   Follow-up Information       Follow up With Specialties Details Why Contact Info Additional Information    Fly Zarate DO Family Medicine Schedule an appointment as soon as possible for a visit  As needed [address] [facility] [address] [phone]       [facility] Emergency Department Emergency Medicine  As needed, If symptoms worsen [facility] [facility] [id number] [phone] [facility]  Count includes the [facility] Emergency Department, [address], [place], [address]    Brendan Menendez MD Orthopedic Surgery, Hand Surgery Schedule an appointment as soon as possible for a visit   [address] [facility]  [address]  [facility] [address]  [phone]               Discharge Medication List as of 2/20/2024  2:15 PM        START taking these medications    Details   cephalexin (KEFLEX) 500 mg capsule Take 1 capsule (500 mg total) by mouth every 6 (six) hours for 7 days, Starting Tue 2/20/2024, Until Tue 2/27/2024, Print           CONTINUE these medications which have NOT CHANGED    Details   atorvastatin (LIPITOR) 20 mg tablet Historical Med      Calcium-Vitamin D-Vitamin K 500-1000-40 MG-UNT-MCG CHEW Historical Med      Multiple Vitamin (multivitamin) tablet Take 1 tablet by mouth daily, Historical Med      PREDNISONE PO Take 2.5 mg by mouth 2 (two) times a day with meals , Historical Med               PDMP Review       None             ED Provider  Attending physically available and evaluated Vivian A Fahr. I managed the patient along with the ED Attending.    Electronically Signed by           Jayme Bazzi MD  02/20/24 9008       I interviewed, took the history and examined the patient independently.  I discussed the case with the Resident and reviewed the Resident’s note , prescribed medications, and orders placed.  I supervised the Resident and I agree with the Resident management plan unless otherwise stated in my attestation note.  I was present in the Emergency Department and examined the patient.         Naye Mooney MD  02/23/24 4505

## 2025-07-03 ENCOUNTER — APPOINTMENT (EMERGENCY)
Dept: CT IMAGING | Facility: HOSPITAL | Age: OVER 89
End: 2025-07-03
Payer: MEDICARE

## 2025-07-03 ENCOUNTER — HOSPITAL ENCOUNTER (OUTPATIENT)
Facility: HOSPITAL | Age: OVER 89
Setting detail: OBSERVATION
Discharge: HOME | End: 2025-07-03
Attending: STUDENT IN AN ORGANIZED HEALTH CARE EDUCATION/TRAINING PROGRAM | Admitting: INTERNAL MEDICINE
Payer: MEDICARE

## 2025-07-03 ENCOUNTER — APPOINTMENT (EMERGENCY)
Dept: RADIOLOGY | Facility: HOSPITAL | Age: OVER 89
End: 2025-07-03
Payer: MEDICARE

## 2025-07-03 VITALS
DIASTOLIC BLOOD PRESSURE: 79 MMHG | TEMPERATURE: 98.3 F | HEART RATE: 67 BPM | RESPIRATION RATE: 18 BRPM | OXYGEN SATURATION: 97 % | SYSTOLIC BLOOD PRESSURE: 197 MMHG

## 2025-07-03 DIAGNOSIS — R26.2 AMBULATORY DYSFUNCTION: ICD-10-CM

## 2025-07-03 DIAGNOSIS — M25.551 PAIN OF RIGHT HIP: Primary | ICD-10-CM

## 2025-07-03 LAB
ANION GAP SERPL CALCULATED.3IONS-SCNC: 9 MMOL/L (ref 4–13)
APTT PPP: 26 SECONDS (ref 23–34)
BASOPHILS # BLD AUTO: 0.06 THOUSANDS/ÂΜL (ref 0–0.1)
BASOPHILS NFR BLD AUTO: 1 % (ref 0–1)
BUN SERPL-MCNC: 26 MG/DL (ref 5–25)
CALCIUM SERPL-MCNC: 9.8 MG/DL (ref 8.4–10.2)
CHLORIDE SERPL-SCNC: 99 MMOL/L (ref 96–108)
CO2 SERPL-SCNC: 31 MMOL/L (ref 21–32)
CREAT SERPL-MCNC: 1.11 MG/DL (ref 0.6–1.3)
EOSINOPHIL # BLD AUTO: 0.07 THOUSAND/ÂΜL (ref 0–0.61)
EOSINOPHIL NFR BLD AUTO: 1 % (ref 0–6)
ERYTHROCYTE [DISTWIDTH] IN BLOOD BY AUTOMATED COUNT: 12.5 % (ref 11.6–15.1)
GFR SERPL CREATININE-BSD FRML MDRD: 43 ML/MIN/1.73SQ M
GLUCOSE SERPL-MCNC: 102 MG/DL (ref 65–140)
HCT VFR BLD AUTO: 43.8 % (ref 34.8–46.1)
HGB BLD-MCNC: 14.3 G/DL (ref 11.5–15.4)
IMM GRANULOCYTES # BLD AUTO: 0.08 THOUSAND/UL (ref 0–0.2)
IMM GRANULOCYTES NFR BLD AUTO: 1 % (ref 0–2)
INR PPP: 0.98 (ref 0.85–1.19)
LYMPHOCYTES # BLD AUTO: 1.54 THOUSANDS/ÂΜL (ref 0.6–4.47)
LYMPHOCYTES NFR BLD AUTO: 15 % (ref 14–44)
MCH RBC QN AUTO: 32.6 PG (ref 26.8–34.3)
MCHC RBC AUTO-ENTMCNC: 32.6 G/DL (ref 31.4–37.4)
MCV RBC AUTO: 100 FL (ref 82–98)
MONOCYTES # BLD AUTO: 0.84 THOUSAND/ÂΜL (ref 0.17–1.22)
MONOCYTES NFR BLD AUTO: 8 % (ref 4–12)
NEUTROPHILS # BLD AUTO: 7.43 THOUSANDS/ÂΜL (ref 1.85–7.62)
NEUTS SEG NFR BLD AUTO: 74 % (ref 43–75)
NRBC BLD AUTO-RTO: 0 /100 WBCS
PLATELET # BLD AUTO: 146 THOUSANDS/UL (ref 149–390)
PMV BLD AUTO: 9.8 FL (ref 8.9–12.7)
POTASSIUM SERPL-SCNC: 4 MMOL/L (ref 3.5–5.3)
PROTHROMBIN TIME: 13.7 SECONDS (ref 12.3–15)
RBC # BLD AUTO: 4.38 MILLION/UL (ref 3.81–5.12)
SODIUM SERPL-SCNC: 139 MMOL/L (ref 135–147)
WBC # BLD AUTO: 10.02 THOUSAND/UL (ref 4.31–10.16)

## 2025-07-03 PROCEDURE — 36415 COLL VENOUS BLD VENIPUNCTURE: CPT | Performed by: STUDENT IN AN ORGANIZED HEALTH CARE EDUCATION/TRAINING PROGRAM

## 2025-07-03 PROCEDURE — 80048 BASIC METABOLIC PNL TOTAL CA: CPT | Performed by: STUDENT IN AN ORGANIZED HEALTH CARE EDUCATION/TRAINING PROGRAM

## 2025-07-03 PROCEDURE — 99285 EMERGENCY DEPT VISIT HI MDM: CPT | Performed by: STUDENT IN AN ORGANIZED HEALTH CARE EDUCATION/TRAINING PROGRAM

## 2025-07-03 PROCEDURE — 73502 X-RAY EXAM HIP UNI 2-3 VIEWS: CPT

## 2025-07-03 PROCEDURE — 85730 THROMBOPLASTIN TIME PARTIAL: CPT | Performed by: STUDENT IN AN ORGANIZED HEALTH CARE EDUCATION/TRAINING PROGRAM

## 2025-07-03 PROCEDURE — 85610 PROTHROMBIN TIME: CPT | Performed by: STUDENT IN AN ORGANIZED HEALTH CARE EDUCATION/TRAINING PROGRAM

## 2025-07-03 PROCEDURE — NC001 PR NO CHARGE: Performed by: ORTHOPAEDIC SURGERY

## 2025-07-03 PROCEDURE — 99284 EMERGENCY DEPT VISIT MOD MDM: CPT

## 2025-07-03 PROCEDURE — 85025 COMPLETE CBC W/AUTO DIFF WBC: CPT | Performed by: STUDENT IN AN ORGANIZED HEALTH CARE EDUCATION/TRAINING PROGRAM

## 2025-07-03 PROCEDURE — 74176 CT ABD & PELVIS W/O CONTRAST: CPT

## 2025-07-03 RX ORDER — ACETAMINOPHEN 325 MG/1
975 TABLET ORAL ONCE
Status: COMPLETED | OUTPATIENT
Start: 2025-07-03 | End: 2025-07-03

## 2025-07-03 RX ADMIN — ACETAMINOPHEN 975 MG: 325 TABLET ORAL at 10:46

## 2025-07-03 NOTE — Clinical Note
Case was discussed with DANIELLE and the patient's admission status was agreed to be Admission Status: observation status to the service of Dr. Yap .

## 2025-07-03 NOTE — CONSULTS
Consultation - Orthopedics   Name: Vivian A Fahr 89 y.o. female I MRN: [id number]  Unit/Bed#: ED-24 I Date of Admission: 7/3/2025   Date of Service: 7/3/2025 I Hospital Day: 0   Inpatient consult to Orthopedic Surgery  Consult performed by: Man Ching MD  Consult ordered by: Janessa Stanley MD        Physician Requesting Evaluation: Amparo Yap MD   Reason for Evaluation / Principal Problem: Right hip pain    Assessment & Plan  Right hip pain  Patient is a 89 year old female who presented to the ED after a fall due to hip pain. X-ray of hips/pelvis demonstrate arthritic changes in the right hip but no fractures or other changes.  WBAT bilateral lower extremities  Patient to follow up in clinic with non-op sports medicine for potential corticosteroid injection to hip, which pt reported has helped symptoms in the past.  Multimodal pain control  She would benefit from physical therapy  No further considerations from an orthopedic perspective, OK for DC from ED      History of Present Illness   HPI: Vivian A Fahr is a 89 y.o. year old female who presents to the ED after sudden onset right hip pain on 6/30/2025 that resulted in her falling on her behind. She reports that since the fall, she has had consistent pain in her right groin region that worsens when she walks. She denies any back pain or any numbness, tingling, or urinary/bowel complaints. She normally ambulates with no assistive devices but today reports that since her fall, she has relied on a walker to help her ambulate. She states that her pain is not severe and that she is presenting to the ED today due to concerns from her son Martin, who accompanied her today. She has known right hip osteoarthritis and has benefited from intra-articular CSI in the past.    Review of Systems significant for findings described in the HPI.  Historical Information   Past Medical History[1]  Past Surgical History[2]  Social History[3]  E-Cigarette/Vaping  "    E-Cigarette/Vaping Substances         Objective :  Temp:  [98.3 °F (36.8 °C)] 98.3 °F (36.8 °C)  HR:  [65-67] 67  BP: (197-199)/(79) 197/79  Resp:  [16-18] 18  SpO2:  [95 %-97 %] 97 %  O2 Device: None (Room air)    Physical ExamOrtho Exam   Musculoskeletal: Right hip  Skin is intact with no erythema or ecchymosis.  2+ DP pulses and toes warm and well perfused   Neurologic Status- motor intact EHL/FHL, ankle df/pf; SILT s/s/t/sp/dp  No leg length or angulation discrepancy  No pain on straight leg raise, negative Stinchfield exam.  No calf swelling or tenderness to palpation throughout entire hip      Lab Results: I have reviewed the following results:   Recent Labs     07/03/25  1133   WBC 10.02   HGB 14.3   HCT 43.8   *   BUN 26*   CREATININE 1.11   PTT 26   INR 0.98     Blood Culture: No results found for: \"BLOODCX\"  Wound Culture: No results found for: \"WOUNDCULT\"    Imaging Results Review: I personally reviewed the following image studies in PACS and associated radiology reports: X-rays of hip and pelvis. My interpretation of the radiology images/reports is: Moderate to severe arthritic changes with diminished joint space in right hip. No fractures or acute changes.           [1] No past medical history on file.  [2]   Past Surgical History:  Procedure Laterality Date    BREAST BIOPSY Left 02/18/2010    [facility]    HYSTERECTOMY      age 37    OOPHORECTOMY Right     age 37   [3]   Social History  Tobacco Use    Smoking status: Never    Smokeless tobacco: Never   Substance and Sexual Activity    Alcohol use: Never    Drug use: Never     "

## 2025-07-03 NOTE — ED PROVIDER NOTES
ED Disposition       None          Assessment & Plan   {Hyperlinks  Risk Stratification - NIHSS - HEART SCORE - Fill out sepsis note and make sure you call [id number] if severe or septic shock:[id number]}    Medical Decision Making  89-year-old female with history of rheumatoid and osteoarthritis, heart block status post pacemaker, diverticulitis status post remote colon resection who presents with atraumatic right hip pain that happened suddenly on Monday of this week causing her to fall onto her rear end.  She has had persistent pain in the right hip without associated numbness/tingling/weakness, abdominal pain, back pain, urinary retention/constipation, or any other complaints.  She has been ambulating with assistance but has had severe pain in the right hip when ambulating since then.    Vitals with hypertension but otherwise stable and afebrile.  Lung and heart sounds normal, no abdominal tenderness to palpation.  Right hip tender to palpation without pelvic instability.  2+ DP/PT pulses with SILT in all dermatomes and 5/5 strength with dorsi/plantarflexion bilaterally.      Differential diagnosis includes but is not limited to: Pathologic fracture, contusion, arthritis.  Doubt DVT    Workup and treatment as below:    Imaging: See ED course  EKG: N/A  Labs: N/A  Meds: Analgesia  Consults: [N/A]  Reassessment: [N/A]    Dispo: [{ Dispo:[id number]}]    Amount and/or Complexity of Data Reviewed  Radiology: ordered.    Risk  OTC drugs.             Medications   acetaminophen (TYLENOL) tablet 975 mg (has no administration in time range)       ED Risk Strat Scores                    No data recorded        SBIRT 22yo+      Flowsheet Row Most Recent Value   Initial Alcohol Screen: US AUDIT-C     1. How often do you have a drink containing alcohol? 0 Filed at: 07/03/2025 1031   2. How many drinks containing alcohol do you have on a typical day you are drinking?  0 Filed at: 07/03/2025 1031   3a. Male UNDER 65: How often do  you have five or more drinks on one occasion? 0 Filed at: 07/03/2025 1031   3b. FEMALE Any Age, or MALE 65+: How often do you have 4 or more drinks on one occassion? 0 Filed at: 07/03/2025 1031   Audit-C Score 0 Filed at: 07/03/2025 1031   HASMUKH: How many times in the past year have you...    Used an illegal drug or used a prescription medication for non-medical reasons? Never Filed at: 07/03/2025 1031                            History of Present Illness   {Hyperlinks  History (Med, Surg, Fam, Social) - Current Medications - Allergies  :[id number]}    Chief Complaint   Patient presents with    Leg Pain     Arrives with c/o pain in right leg/hip/groin area. Hx of arthritis. Had a fall on Monday, d/t pain. Landed on buttocks. -HS. +ASA.        Past Medical History[1]   Past Surgical History[2]   Family History[3]   Social History[4]   E-Cigarette/Vaping      E-Cigarette/Vaping Substances      I have reviewed and agree with the history as documented.     89-year-old female with history of rheumatoid and osteoarthritis, heart block status post pacemaker, diverticulitis status post remote colon resection who presents with atraumatic right hip pain that happened suddenly on Monday of this week causing her to fall onto her rear end.  She has had persistent pain in the right hip without associated numbness/tingling/weakness, abdominal pain, back pain, urinary retention/constipation, or any other complaints.  She has been ambulating with assistance but has had severe pain in the right hip when ambulating since then.      Leg Pain  Associated symptoms: no back pain and no fever        Review of Systems   Constitutional:  Negative for chills and fever.   HENT:  Negative for ear pain and sore throat.    Eyes:  Negative for pain and visual disturbance.   Respiratory:  Negative for cough and shortness of breath.    Cardiovascular:  Negative for chest pain and palpitations.   Gastrointestinal:  Negative for abdominal pain, blood  in stool, constipation, diarrhea, nausea and vomiting.   Genitourinary:  Negative for dysuria and hematuria.   Musculoskeletal:  Positive for arthralgias. Negative for back pain.   Skin:  Negative for color change and rash.   Neurological:  Negative for dizziness, seizures, syncope, facial asymmetry, speech difficulty, weakness, light-headedness, numbness and headaches.   All other systems reviewed and are negative.          Objective   {Hyperlinks  Historical Vitals - Historical Labs - Chart Review/Microbiology - Last Echo - Code Status  :[id number]}    ED Triage Vitals [07/03/25 1030]   Temperature Pulse Blood Pressure Respirations SpO2 Patient Position - Orthostatic VS   98.3 °F (36.8 °C) 65 (!) 199/79 16 95 % Sitting      Temp Source Heart Rate Source BP Location FiO2 (%) Pain Score    Oral Monitor Right arm -- --      Vitals      Date and Time Temp Pulse SpO2 Resp BP Pain Score FACES Pain Rating User   07/03/25 1030 98.3 °F (36.8 °C) 65 95 % 16 199/79 -- -- LR            Physical Exam  Vitals and nursing note reviewed.   Constitutional:       General: She is not in acute distress.     Appearance: Normal appearance. She is normal weight. She is not ill-appearing or toxic-appearing.   HENT:      Head: Normocephalic.      Nose: Nose normal.      Mouth/Throat:      Mouth: Mucous membranes are moist.      Pharynx: Oropharynx is clear.     Eyes:      Conjunctiva/sclera: Conjunctivae normal.       Cardiovascular:      Rate and Rhythm: Normal rate and regular rhythm.      Heart sounds: Normal heart sounds.   Pulmonary:      Effort: Pulmonary effort is normal.      Breath sounds: Normal breath sounds.   Abdominal:      General: Abdomen is flat.      Palpations: Abdomen is soft.      Tenderness: There is no abdominal tenderness.     Musculoskeletal:      Comments: Right hip tender to palpation without pelvic instability.  2+ DP/PT pulses with SILT in all dermatomes and 5/5 strength with dorsi/plantarflexion bilaterally.      Skin:     General: Skin is warm and dry.      Capillary Refill: Capillary refill takes less than 2 seconds.     Neurological:      Mental Status: She is alert and oriented to person, place, and time.     Psychiatric:         Mood and Affect: Mood normal.         Results Reviewed       None            XR hip/pelv 2-3 vws right    (Results Pending)       Procedures    ED Medication and Procedure Management   Prior to Admission Medications   Prescriptions Last Dose Informant Patient Reported? Taking?   Calcium-Vitamin D-Vitamin K 500-1000-40 MG-UNT-MCG CHEW   Yes No   Multiple Vitamin (multivitamin) tablet   Yes No   Sig: Take 1 tablet by mouth daily   PREDNISONE PO   Yes No   Sig: Take 2.5 mg by mouth 2 (two) times a day with meals    atorvastatin (LIPITOR) 20 mg tablet   Yes No      Facility-Administered Medications: None     Patient's Medications   Discharge Prescriptions    No medications on file     No discharge procedures on file.  ED SEPSIS DOCUMENTATION              [1] No past medical history on file.  [2]   Past Surgical History:  Procedure Laterality Date    BREAST BIOPSY Left 02/18/2010    calcs    HYSTERECTOMY      age 37    OOPHORECTOMY Right     age 37   [3]   Family History  Problem Relation Name Age of Onset    No Known Problems Mother      No Known Problems Father      No Known Problems Sister      No Known Problems Daughter      No Known Problems Maternal Grandmother      No Known Problems Maternal Grandfather      No Known Problems Paternal Grandmother      No Known Problems Paternal Grandfather      No Known Problems Sister      No Known Problems Son      No Known Problems Son      No Known Problems Son     [4]   Social History  Tobacco Use    Smoking status: Never    Smokeless tobacco: Never   Substance Use Topics    Alcohol use: Never    Drug use: Never      Stage 2 Mild CKD (GFR = 60-89 mL/min/1.73 square meters)    Stage 3A Moderate CKD (GFR = 45-59 mL/min/1.73 square meters)    Stage 3B Moderate CKD (GFR = 30-44 mL/min/1.73 square meters)    Stage 4 Severe CKD (GFR = 15-29 mL/min/1.73 square meters)    Stage 5 End Stage CKD (GFR <15 mL/min/1.73 square meters)  Note: GFR calculation is accurate only with a steady state creatinine    CBC and differential [[id number]]  (Abnormal) Collected: 07/03/25 1133    Lab Status: Final result Specimen: Blood from Arm, Left Updated: 07/03/25 1202     WBC 10.02 Thousand/uL      RBC 4.38 Million/uL      Hemoglobin 14.3 g/dL      Hematocrit 43.8 %       fL      MCH 32.6 pg      MCHC 32.6 g/dL      RDW 12.5 %      MPV 9.8 fL      Platelets 146 Thousands/uL      nRBC 0 /100 WBCs      Segmented % 74 %      Immature Grans % 1 %      Lymphocytes % 15 %      Monocytes % 8 %      Eosinophils Relative 1 %      Basophils Relative 1 %      Absolute Neutrophils 7.43 Thousands/µL      Absolute Immature Grans 0.08 Thousand/uL      Absolute Lymphocytes 1.54 Thousands/µL      Absolute Monocytes 0.84 Thousand/µL      Eosinophils Absolute 0.07 Thousand/µL      Basophils Absolute 0.06 Thousands/µL     Protime-INR [[id number]]  (Normal) Collected: 07/03/25 1133    Lab Status: Final result Specimen: Blood from Arm, Left Updated: 07/03/25 1201     Protime 13.7 seconds      INR 0.98    Narrative:      INR Therapeutic Range    Indication                                             INR Range      Atrial Fibrillation                                               2.0-3.0  Hypercoagulable State                                    2.0.2.3  Left Ventricular Asist Device                            2.0-3.0  Mechanical Heart Valve                                  -    Aortic(with afib, MI, embolism, HF, LA enlargement,    and/or coagulopathy)                                     2.0-3.0 (2.5-3.5)     Mitral                                                              2.5-3.5  Prosthetic/Bioprosthetic Heart Valve               2.0-3.0  Venous thromboembolism (VTE: VT, PE        2.0-3.0    APTT [[id number]]  (Normal) Collected: 07/03/25 1133    Lab Status: Final result Specimen: Blood from Arm, Left Updated: 07/03/25 1201     PTT 26 seconds             CT abdomen pelvis wo contrast   Final Interpretation by Júnior Montelongo MD (07/03 1210)      No evidence of traumatic injury in the abdomen or pelvis.      Workstation performed: [id number]         XR hip/pelv 2-3 vws right   Final Interpretation by Layo Demarco MD (07/04 1224)      No acute osseous abnormality.         Computerized Assisted Algorithm (CAA) may have been used to analyze all applicable images.            Workstation performed: [id number]             Procedures    ED Medication and Procedure Management   Prior to Admission Medications   Prescriptions Last Dose Informant Patient Reported? Taking?   Calcium-Vitamin D-Vitamin K 500-1000-40 MG-UNT-MCG CHEW   Yes No   Multiple Vitamin (multivitamin) tablet   Yes No   Sig: Take 1 tablet by mouth daily   PREDNISONE PO   Yes No   Sig: Take 2.5 mg by mouth 2 (two) times a day with meals    atorvastatin (LIPITOR) 20 mg tablet   Yes No      Facility-Administered Medications: None     Discharge Medication List as of 7/3/2025  2:44 PM        CONTINUE these medications which have NOT CHANGED    Details   atorvastatin (LIPITOR) 20 mg tablet Historical Med      Calcium-Vitamin D-Vitamin K 500-1000-40 MG-UNT-MCG CHEW Historical Med      Multiple Vitamin (multivitamin) tablet Take 1 tablet by mouth daily, Historical Med      PREDNISONE PO Take 2.5 mg by mouth 2 (two) times a day with meals , Historical Med           No discharge procedures on file.  ED SEPSIS DOCUMENTATION   Time reflects when diagnosis was documented in both MDM as applicable and the Disposition within this note       Time User Action Codes Description Comment    7/3/2025  1:10 PM Janessa Stanley Add  [M25.551] Pain of right hip     7/3/2025  1:44 PM Janessa Stanley Add [R26.2] Ambulatory dysfunction                    [1] No past medical history on file.  [2]   Past Surgical History:  Procedure Laterality Date    BREAST BIOPSY Left 02/18/2010    calcs    HYSTERECTOMY      age 37    OOPHORECTOMY Right     age 37   [3]   Family History  Problem Relation Name Age of Onset    No Known Problems Mother      No Known Problems Father      No Known Problems Sister      No Known Problems Daughter      No Known Problems Maternal Grandmother      No Known Problems Maternal Grandfather      No Known Problems Paternal Grandmother      No Known Problems Paternal Grandfather      No Known Problems Sister      No Known Problems Son      No Known Problems Son      No Known Problems Son     [4]   Social History  Tobacco Use    Smoking status: Never    Smokeless tobacco: Never   Substance Use Topics    Alcohol use: Never    Drug use: Never        Janessa Stanley MD  07/08/25 0644

## 2025-07-03 NOTE — ASSESSMENT & PLAN NOTE
Patient is a 89 year old female who presented to the ED after a fall due to hip pain. X-ray of hips/pelvis demonstrate arthritic changes in the right hip but no fractures or other changes.  WBAT bilateral lower extremities  Patient to follow up in clinic with non-op sports medicine for potential corticosteroid injection to hip, which pt reported has helped symptoms in the past.  Multimodal pain control  She would benefit from physical therapy  No further considerations from an orthopedic perspective, OK for DC from ED

## 2025-07-03 NOTE — DISCHARGE INSTRUCTIONS
You were seen in the Emergency Department for: Right hip pain    Your workup today showed: Reassuring physical exam and imaging    Your next steps should include: Please call today to make an appointment with your primary care provider in one week.    Reasons to RETURN IMMEDIATELY to the Emergency Department: worsening symptoms, difficulty breathing, temperature > 100.4 degrees, uncontrollable nausea/vomiting, or any other concerns.